# Patient Record
Sex: MALE | Race: WHITE | Employment: FULL TIME | URBAN - METROPOLITAN AREA
[De-identification: names, ages, dates, MRNs, and addresses within clinical notes are randomized per-mention and may not be internally consistent; named-entity substitution may affect disease eponyms.]

---

## 2017-10-18 ENCOUNTER — HOSPITAL ENCOUNTER (OUTPATIENT)
Dept: SURGERY | Age: 67
Discharge: HOME | End: 2017-10-18
Attending: SURGERY

## 2017-10-18 VITALS
OXYGEN SATURATION: 97 % | HEIGHT: 71 IN | DIASTOLIC BLOOD PRESSURE: 84 MMHG | HEART RATE: 63 BPM | WEIGHT: 181 LBS | RESPIRATION RATE: 16 BRPM | SYSTOLIC BLOOD PRESSURE: 150 MMHG | BODY MASS INDEX: 25.34 KG/M2

## 2017-10-24 ENCOUNTER — ANESTHESIA EVENT (OUTPATIENT)
Dept: SURGERY | Age: 67
End: 2017-10-24
Payer: MEDICARE

## 2017-10-25 ENCOUNTER — ANESTHESIA (OUTPATIENT)
Dept: SURGERY | Age: 67
End: 2017-10-25
Payer: MEDICARE

## 2017-10-25 ENCOUNTER — HOSPITAL ENCOUNTER (OUTPATIENT)
Age: 67
Setting detail: OUTPATIENT SURGERY
Discharge: HOME | End: 2017-10-25
Attending: SURGERY | Admitting: SURGERY
Payer: MEDICARE

## 2017-10-25 VITALS
WEIGHT: 181 LBS | DIASTOLIC BLOOD PRESSURE: 75 MMHG | HEART RATE: 62 BPM | TEMPERATURE: 97.8 F | BODY MASS INDEX: 25.34 KG/M2 | OXYGEN SATURATION: 96 % | HEIGHT: 71 IN | SYSTOLIC BLOOD PRESSURE: 134 MMHG | RESPIRATION RATE: 16 BRPM

## 2017-10-25 PROCEDURE — 88304 TISSUE EXAM BY PATHOLOGIST: CPT | Performed by: SURGERY

## 2017-10-25 PROCEDURE — 74011250636 HC RX REV CODE- 250/636

## 2017-10-25 PROCEDURE — 76210000006 HC OR PH I REC 0.5 TO 1 HR: Performed by: SURGERY

## 2017-10-25 PROCEDURE — 74011000250 HC RX REV CODE- 250: Performed by: SURGERY

## 2017-10-25 PROCEDURE — 77030002982 HC SUT POLYSRB J&J -A: Performed by: SURGERY

## 2017-10-25 PROCEDURE — 77030008467 HC STPLR SKN COVD -B: Performed by: SURGERY

## 2017-10-25 PROCEDURE — 77030018836 HC SOL IRR NACL ICUM -A: Performed by: SURGERY

## 2017-10-25 PROCEDURE — 74011250637 HC RX REV CODE- 250/637: Performed by: ANESTHESIOLOGY

## 2017-10-25 PROCEDURE — 76210000020 HC REC RM PH II FIRST 0.5 HR: Performed by: SURGERY

## 2017-10-25 PROCEDURE — 77030020782 HC GWN BAIR PAWS FLX 3M -B: Performed by: ANESTHESIOLOGY

## 2017-10-25 PROCEDURE — 76010000160 HC OR TIME 0.5 TO 1 HR INTENSV-TIER 1: Performed by: SURGERY

## 2017-10-25 PROCEDURE — 77030002916 HC SUT ETHLN J&J -A: Performed by: SURGERY

## 2017-10-25 PROCEDURE — 74011250636 HC RX REV CODE- 250/636: Performed by: SURGERY

## 2017-10-25 PROCEDURE — 74011250636 HC RX REV CODE- 250/636: Performed by: ANESTHESIOLOGY

## 2017-10-25 PROCEDURE — 74011000250 HC RX REV CODE- 250: Performed by: ANESTHESIOLOGY

## 2017-10-25 PROCEDURE — 76060000032 HC ANESTHESIA 0.5 TO 1 HR: Performed by: SURGERY

## 2017-10-25 PROCEDURE — 77030011640 HC PAD GRND REM COVD -A: Performed by: SURGERY

## 2017-10-25 RX ORDER — HYDROMORPHONE HYDROCHLORIDE 2 MG/ML
0.5 INJECTION, SOLUTION INTRAMUSCULAR; INTRAVENOUS; SUBCUTANEOUS
Status: DISCONTINUED | OUTPATIENT
Start: 2017-10-25 | End: 2017-10-25 | Stop reason: HOSPADM

## 2017-10-25 RX ORDER — FENTANYL CITRATE 50 UG/ML
100 INJECTION, SOLUTION INTRAMUSCULAR; INTRAVENOUS ONCE
Status: DISCONTINUED | OUTPATIENT
Start: 2017-10-25 | End: 2017-10-25 | Stop reason: HOSPADM

## 2017-10-25 RX ORDER — LIDOCAINE HYDROCHLORIDE AND EPINEPHRINE 10; 10 MG/ML; UG/ML
INJECTION, SOLUTION INFILTRATION; PERINEURAL AS NEEDED
Status: DISCONTINUED | OUTPATIENT
Start: 2017-10-25 | End: 2017-10-25 | Stop reason: HOSPADM

## 2017-10-25 RX ORDER — MIDAZOLAM HYDROCHLORIDE 1 MG/ML
5 INJECTION, SOLUTION INTRAMUSCULAR; INTRAVENOUS ONCE
Status: DISCONTINUED | OUTPATIENT
Start: 2017-10-25 | End: 2017-10-25 | Stop reason: HOSPADM

## 2017-10-25 RX ORDER — MIDAZOLAM HYDROCHLORIDE 1 MG/ML
2 INJECTION, SOLUTION INTRAMUSCULAR; INTRAVENOUS
Status: DISCONTINUED | OUTPATIENT
Start: 2017-10-25 | End: 2017-10-25 | Stop reason: HOSPADM

## 2017-10-25 RX ORDER — LIDOCAINE HYDROCHLORIDE 10 MG/ML
INJECTION INFILTRATION; PERINEURAL AS NEEDED
Status: DISCONTINUED | OUTPATIENT
Start: 2017-10-25 | End: 2017-10-25 | Stop reason: HOSPADM

## 2017-10-25 RX ORDER — OXYCODONE AND ACETAMINOPHEN 5; 325 MG/1; MG/1
1-2 TABLET ORAL
Qty: 30 TAB | Refills: 0 | Status: SHIPPED | OUTPATIENT
Start: 2017-10-25 | End: 2018-05-01

## 2017-10-25 RX ORDER — SODIUM CHLORIDE, SODIUM LACTATE, POTASSIUM CHLORIDE, CALCIUM CHLORIDE 600; 310; 30; 20 MG/100ML; MG/100ML; MG/100ML; MG/100ML
75 INJECTION, SOLUTION INTRAVENOUS CONTINUOUS
Status: DISCONTINUED | OUTPATIENT
Start: 2017-10-25 | End: 2017-10-25 | Stop reason: HOSPADM

## 2017-10-25 RX ORDER — FAMOTIDINE 20 MG/1
20 TABLET, FILM COATED ORAL ONCE
Status: COMPLETED | OUTPATIENT
Start: 2017-10-25 | End: 2017-10-25

## 2017-10-25 RX ORDER — CEFAZOLIN SODIUM IN 0.9 % NACL 2 G/50 ML
2 INTRAVENOUS SOLUTION, PIGGYBACK (ML) INTRAVENOUS ONCE
Status: COMPLETED | OUTPATIENT
Start: 2017-10-25 | End: 2017-10-25

## 2017-10-25 RX ORDER — LIDOCAINE HYDROCHLORIDE 10 MG/ML
0.1 INJECTION INFILTRATION; PERINEURAL AS NEEDED
Status: DISCONTINUED | OUTPATIENT
Start: 2017-10-25 | End: 2017-10-25 | Stop reason: HOSPADM

## 2017-10-25 RX ORDER — MIDAZOLAM HYDROCHLORIDE 1 MG/ML
INJECTION, SOLUTION INTRAMUSCULAR; INTRAVENOUS AS NEEDED
Status: DISCONTINUED | OUTPATIENT
Start: 2017-10-25 | End: 2017-10-25 | Stop reason: HOSPADM

## 2017-10-25 RX ORDER — EPINEPHRINE 1 MG/ML
INJECTION, SOLUTION, CONCENTRATE INTRAVENOUS AS NEEDED
Status: DISCONTINUED | OUTPATIENT
Start: 2017-10-25 | End: 2017-10-25 | Stop reason: HOSPADM

## 2017-10-25 RX ORDER — OXYCODONE HYDROCHLORIDE 5 MG/1
5 TABLET ORAL
Status: DISCONTINUED | OUTPATIENT
Start: 2017-10-25 | End: 2017-10-25 | Stop reason: HOSPADM

## 2017-10-25 RX ORDER — PROPOFOL 10 MG/ML
INJECTION, EMULSION INTRAVENOUS AS NEEDED
Status: DISCONTINUED | OUTPATIENT
Start: 2017-10-25 | End: 2017-10-25 | Stop reason: HOSPADM

## 2017-10-25 RX ORDER — BUPIVACAINE HYDROCHLORIDE 5 MG/ML
INJECTION, SOLUTION EPIDURAL; INTRACAUDAL AS NEEDED
Status: DISCONTINUED | OUTPATIENT
Start: 2017-10-25 | End: 2017-10-25 | Stop reason: HOSPADM

## 2017-10-25 RX ORDER — HYDROCODONE BITARTRATE AND ACETAMINOPHEN 5; 325 MG/1; MG/1
2 TABLET ORAL AS NEEDED
Status: DISCONTINUED | OUTPATIENT
Start: 2017-10-25 | End: 2017-10-25 | Stop reason: HOSPADM

## 2017-10-25 RX ADMIN — PROPOFOL 40 MG: 10 INJECTION, EMULSION INTRAVENOUS at 14:22

## 2017-10-25 RX ADMIN — PROPOFOL 30 MG: 10 INJECTION, EMULSION INTRAVENOUS at 14:34

## 2017-10-25 RX ADMIN — MIDAZOLAM HYDROCHLORIDE 2 MG: 1 INJECTION, SOLUTION INTRAMUSCULAR; INTRAVENOUS at 14:13

## 2017-10-25 RX ADMIN — LIDOCAINE HYDROCHLORIDE 0.1 ML: 10 INJECTION, SOLUTION INFILTRATION; PERINEURAL at 13:26

## 2017-10-25 RX ADMIN — PROPOFOL 30 MG: 10 INJECTION, EMULSION INTRAVENOUS at 14:28

## 2017-10-25 RX ADMIN — FAMOTIDINE 20 MG: 20 TABLET, FILM COATED ORAL at 13:14

## 2017-10-25 RX ADMIN — CEFAZOLIN 2 G: 1 INJECTION, POWDER, FOR SOLUTION INTRAMUSCULAR; INTRAVENOUS; PARENTERAL at 14:09

## 2017-10-25 RX ADMIN — PROPOFOL 30 MG: 10 INJECTION, EMULSION INTRAVENOUS at 14:31

## 2017-10-25 RX ADMIN — PROPOFOL 20 MG: 10 INJECTION, EMULSION INTRAVENOUS at 14:30

## 2017-10-25 RX ADMIN — PROPOFOL 20 MG: 10 INJECTION, EMULSION INTRAVENOUS at 14:23

## 2017-10-25 RX ADMIN — PROPOFOL 30 MG: 10 INJECTION, EMULSION INTRAVENOUS at 14:26

## 2017-10-25 RX ADMIN — SODIUM CHLORIDE, SODIUM LACTATE, POTASSIUM CHLORIDE, AND CALCIUM CHLORIDE 75 ML/HR: 600; 310; 30; 20 INJECTION, SOLUTION INTRAVENOUS at 13:26

## 2017-10-25 NOTE — IP AVS SNAPSHOT
[address] [address] 
[phone] Patient: Rito Whitaker MRN: [id number]:18/05/[id number] About your hospitalization You were admitted on:  October 25, 2017 You last received care in the:  [facility] PACU You were discharged on:  October 25, 2017 Why you were hospitalized Your primary diagnosis was:  Not on File Things You Need To Do (next 8 weeks) Follow up with Junior Davis MD  
  
  
 Follow up with Deirdre Henry MD  
  
Phone:  [phone] Where:  [address] [id number] Monday Nov 06, 2017 Global Post Op with Deirdre Henry MD at  1:30 PM  
Where:  [address] [facility] ([address] [facility]) Discharge Orders None A check silvia indicates which time of day the medication should be taken. My Medications TAKE these medications as instructed Instructions Each Dose to Equal  
 Morning Noon Evening Bedtime  
 aspirin delayed-release 81 mg tablet Your last dose was: Your next dose is: Take 81 mg by mouth every evening. 81 mg GARLIC PO Your last dose was: Your next dose is: Take  by mouth daily. multivitamin tablet Commonly known as:  ONE A DAY Your last dose was: Your next dose is: Take 1 Tab by mouth daily. 1 Tab  
    
   
   
   
  
 oxyCODONE-acetaminophen 5-325 mg per tablet Commonly known as:  PERCOCET Your last dose was: Your next dose is: Take 1-2 Tabs by mouth every four (4) hours as needed for Pain. Max Daily Amount: 12 Tabs. 1-2 Tab  
    
   
   
   
  
 TUMS PO Your last dose was: Your next dose is: Take  by mouth as needed. Where to Get Your Medications Information on where to get these meds will be given to you by the nurse or doctor. ! Ask your nurse or doctor about these medications  
  oxyCODONE-acetaminophen 5-325 mg per tablet Discharge Instructions 1. Diet as tolerated except for a  low fat diet after laparoscopic cholecystectomy. 2. Showering is allowed, but no tub baths, hot tubs or swimming. 3. Drainage is common from the wounds. Change the dressings as needed. Call our office if the wounds become reddened, tender, feel warm to the touch or pus starts to drain from the wound. 4. Take prescribed pain medication as directed, usually Percocet, Norco, Ultram or Dilaudid. Take over the counter medication for minor pain. 5. Ice may be applied intermittently to the surgical site or sites. 6. Call or office, [phone], if problems arise. 7. Follow up in the office at the assigned time. 8. Resume all medications as taken per surgery, unless specifically instructed not to take certain ones. 9. No lifting more than 25 pounds until told otherwise. 10. Driving is allowed 3 days after surgery as long as you feel comfortable enough to drive and have not taken any prescription pain medication prior to driving. Introducing hospitals & HEALTH SERVICES! [facility] introduces TVDeck patient portal. Now you can access parts of your medical record, email your doctor's office, and request medication refills online. 1. In your internet browser, go to [email or web address] 2. Click on the First Time User? Click Here link in the Sign In box. You will see the New Member Sign Up page. 3. Enter your TVDeck Access Code exactly as it appears below. You will not need to use this code after youve completed the sign-up process. If you do not sign up before the expiration date, you must request a new code. · TV[employer] Access Code: [email or web address] Expires: 1/15/2018 11:25 AM 
 
 4. Enter the last four digits of your Social Security Number (xxxx) and Date of Birth (mm/dd/yyyy) as indicated and click Submit. You will be taken to the next sign-up page. 5. Create a Cooleaf ID. This will be your Cooleaf login ID and cannot be changed, so think of one that is secure and easy to remember. 6. Create a Co[employer] password. You can change your password at any time. 7. Enter your Password Reset Question and Answer. This can be used at a later time if you forget your password. 8. Enter your e-mail address. You will receive e-mail notification when new information is available in [address] 9. Click Sign Up. You can now view and download portions of your medical record. 10. Click the Download Summary menu link to download a portable copy of your medical information. If you have questions, please visit the Frequently Asked Questions section of the [employer] website. Remember, Cooleaf is NOT to be used for urgent needs. For medical emergencies, dial 911. Now available from your iPhone and Android! Providers Seen During Your Hospitalization Provider Specialty Primary office phone Chris Andre, [address] [facility] [phone] Your Primary Care Physician (PCP) Primary Care Physician Office Phone Office Fax Alisson BERMUDEZ ** None ** ** None ** You are allergic to the following Allergen Reactions Prilosec (Omeprazole) Other (comments) Affected pt's kidney function Zithromax (Azithromycin) Diarrhea Recent Documentation Height Weight BMI Smoking Status 1.803 m 82.1 kg 25.24 kg/m2 Never Smoker Emergency Contacts Name Discharge Info Relation Home Work Mobile [address] CAREGIVER [3] Spouse [3] [phone] Patient Belongings The following personal items are in your possession at time of discharge: 
  Dental Appliances: None Please provide this summary of care documentation to your next provider. Signatures-by signing, you are acknowledging that this After Visit Summary has been reviewed with you and you have received a copy. Patient Signature:  ____________________________________________________________ Date:  ____________________________________________________________  
  
[facility] Provider Signature:  ____________________________________________________________ Date:  ____________________________________________________________

## 2017-10-25 NOTE — ANESTHESIA POSTPROCEDURE EVALUATION
Post-Anesthesia Evaluation and Assessment    Patient: Apollo Dill MRN: [id number]  SSN: [id number]    YOB: 1950  Age: 74 y.o. Sex: male       Cardiovascular Function/Vital Signs  Visit Vitals    /70 (BP 1 Location: Left arm, BP Patient Position: At rest)    Pulse 60    Temp 36.6 °C (97.8 °F)    Resp 16    Ht 5' 11\" (1.803 m)    Wt 82.1 kg (181 lb)    SpO2 95%    BMI 25.24 kg/m2       Patient is status post total IV anesthesia anesthesia for Procedure(s):  PILONIDAL CYSTECTOMY. Nausea/Vomiting: None    Postoperative hydration reviewed and adequate. Pain:  Pain Scale 1: Numeric (0 - 10) (10/25/17 1444)  Pain Intensity 1: 0 (10/25/17 1444)   Managed    Neurological Status:   Neuro (WDL): Within Defined Limits (10/25/17 1444)  Neuro  Neurologic State: Alert;Eyes open spontaneously (10/25/17 1444)  Cognition: Follows commands (10/25/17 1444)   At baseline    Mental Status and Level of Consciousness: Arousable    Pulmonary Status:   O2 Device: Room air (10/25/17 1459)   Adequate oxygenation and airway patent    Complications related to anesthesia: None    Post-anesthesia assessment completed.  No concerns    Signed By: Chloe Zepeda MD     October 25, 2017

## 2017-10-25 NOTE — OP NOTES
[address]   OPERATIVE REPORT       Name:  Kaylee Dee   MR#:  [id number]   :  1950   Account #:  [de-identified]   Date of Adm:  10/25/2017       PREPROCEDURE DIAGNOSIS: Pilonidal cyst.    POSTPROCEDURE DIAGNOSIS: Pilonidal cyst.    NAME OF PROCEDURE: Pilonidal cystectomy. SURGEON: Jenae Torres MD.    ANESTHESIA: Monitored anesthesia care +80 mL of local using 30   mL of 0.5% Marcaine with epinephrine, 20 mL 1% Xylocaine with   epinephrine, 20 mL of 1% Xylocaine without epinephrine. ESTIMATED BLOOD LOSS: 5 mL. SPECIMENS: Pilonidal cyst tissue. HISTORY: This is a 67-year-old male who was referred by [facility] for a pilonidal cyst. He was placed on   antibiotics. By the time he got to me, it had already ceased   being infected. It had already gotten infected twice. He asked   what surgically could be done to prevent this from returning. I   said we could excise the areas of pilonidal cystectomy. He was   agreeable. I went through the procedure, risks of bleeding,   infection, anesthesia, injury to the surrounding tissue,   numbness, potential for recurrence in the lesion or the problem. The patient was agreeable, signed a consent form. DESCRIPTION OF PROCEDURE: The patient was seen in the   preoperative area. He was then transported to room #1 at [address] He was placed on the operating table   in the prone position. He did not want to have general   anesthesia, he was given some sedation. The area was prepped and   draped in the usual sterile manner. I did a timeout identifying   the patient, surgeon, procedure and his birth date. Once   everyone in the room agreed, we begin the procedure.  I injected   30 mL of 0.5% Marcaine with epinephrine, 20 mL of 1% Xylocaine   with epinephrine, and then finally 20 mL of 1% Xylocaine without   epinephrine, as he was still moving after 50 mL of local. I then   made an elliptical type pattern with a marking pen around some   crypts consistent with pilonidal cyst. We followed the   elliptical pattern with a 15 scalpel blade, excised skin and   soft tissue. The pilonidal cyst tissue was removed and sent to   Pathology for evaluation. Flaps were raised circumferentially. The wound was irrigated and hemostasis achieved with   electrocautery. I closed the deep tissue with interrupted   sutures of 2-0 Vicryl. The skin was closed with vertical   mattress sutures of 2-0 nylon and surgical staples. The patient   had dry sterile dressing applied. Tolerated the procedure   well, was brought to recovery room in stable condition.         Olive Mobley MD      [address] [facility] / [facility]   D:  10/25/2017   14:47   T:  10/25/2017   15:10   Job #:  [id number]

## 2017-10-25 NOTE — ANESTHESIA PREPROCEDURE EVALUATION
Anesthetic History     PONV          Review of Systems / Medical History  Patient summary reviewed and pertinent labs reviewed    Pulmonary  Within defined limits                 Neuro/Psych   Within defined limits           Cardiovascular                  Exercise tolerance: >4 METS     GI/Hepatic/Renal     GERD: well controlled           Endo/Other  Within defined limits           Other Findings            Physical Exam    Airway  Mallampati: III  TM Distance: < 4 cm  Neck ROM: normal range of motion   Mouth opening: Normal     Cardiovascular  Regular rate and rhythm,  S1 and S2 normal,  no murmur, click, rub, or gallop             Dental         Pulmonary  Breath sounds clear to auscultation               Abdominal  GI exam deferred       Other Findings            Anesthetic Plan    ASA: 2  Anesthesia type: total IV anesthesia          Induction: Intravenous  Anesthetic plan and risks discussed with: Patient and Spouse

## 2017-10-25 NOTE — IP AVS SNAPSHOT
[address] [place] [id number] 
[phone] Patient: Gloria Severance MRN: [id number] ZYR:70/22/3352 My Medications TAKE these medications as instructed Instructions Each Dose to Equal  
 Morning Noon Evening Bedtime  
 aspirin delayed-release 81 mg tablet Your last dose was: Your next dose is: Take 81 mg by mouth every evening. 81 mg GARLIC PO Your last dose was: Your next dose is: Take  by mouth daily. multivitamin tablet Commonly known as:  ONE A DAY Your last dose was: Your next dose is: Take 1 Tab by mouth daily. 1 Tab  
    
   
   
   
  
 oxyCODONE-acetaminophen 5-325 mg per tablet Commonly known as:  PERCOCET Your last dose was: Your next dose is: Take 1-2 Tabs by mouth every four (4) hours as needed for Pain. Max Daily Amount: 12 Tabs. 1-2 Tab  
    
   
   
   
  
 TUMS PO Your last dose was: Your next dose is: Take  by mouth as needed. Where to Get Your Medications Information on where to get these meds will be given to you by the nurse or doctor. ! Ask your nurse or doctor about these medications  
  oxyCODONE-acetaminophen 5-325 mg per tablet

## 2017-10-25 NOTE — H&P
Progress Notes  Encounter Date: 10/25/17  Ashish Guevara MD   General Surgery      []Hide copied text  []Saida for attribution information  aDate: 10/25/17        Name: Bonifacio Chris      MRN: [id number]       : 1950       Age: 74 y.o. Sex: male  Samuel Valdivia MD         CC:         Chief Complaint   Patient presents with    New Patient       pilonidal         HPI:      Bonifacio Chris is a 74 y.o. male who presents for evaluation of a pilonidal cyst as a referral from [address]. The patient had the second episode of swelling, pain and drainage over the past nine months last week. He was told to see a surgeon to have the pilonidal cyst evaluated. No fever or chills. Some pain at present, but better since it has \"drained out. \"     PMH:          Past Medical History:   Diagnosis Date    CKD (chronic kidney disease) 2013    Dental disorder      Esophageal reflux 2013    GERD (gastroesophageal reflux disease)      Hyperlipidemia 2013    IFG (impaired fasting glucose) 2013    Prostatitis, chronic 2013         PSH:           Past Surgical History:   Procedure Laterality Date    HX HERNIA REPAIR    &     HX TONSILLECTOMY                  MEDS:          Current Outpatient Prescriptions   Medication Sig    multivitamin (ONE A DAY) tablet Take 1 Tab by mouth daily.     aspirin delayed-release 81 mg tablet Take 81 mg by mouth daily.      No current facility-administered medications for this visit.          ALLERGIES:             Allergies   Allergen Reactions    Prilosec [Omeprazole] Other (comments)       Affected pt's kidney function    Zithromax [Azithromycin] Diarrhea         SH:          Social History   Substance Use Topics    Smoking status: Never Smoker    Smokeless tobacco: Never Used    Alcohol use No         FH:           Family History   Problem Relation Age of Onset    Stroke Mother      Diabetes Sister           ROS: The patient has no difficulty with chest pain or shortness of breath. No fever or chills. Comprehensive 13 point review of systems was otherwise unremarkable except as noted above.     Physical Exam:           Visit Vitals    /89    Pulse 69    Ht 5' 11\" (1.803 m)    Wt 181 lb (82.1 kg)    BMI 25.24 kg/m2         General: Alert, oriented, cooperative white male in no acute distress. Eyes: Sclera are clear. Conjunctiva and lids within normal limits. No icterus. Ears and Nose: no gross deformities to visual inspection, gross hearing intact  Neck: Supple, trachea midline. No lymphadenopathy. Back: Crypts c/w pilonidal cyst with serous drainage in the upper jessica cleft. No cellulitis. Resp: Breathing is  non-labored. Lungs clear to auscultation without wheezing or rhonchi   CV: RRR. No murmurs, rubs or gallops appreciated. Abd: soft non-tender and non-distended without peritoneal signs. +bs    Psych:  Mood and affect appropriate. Short-term memory and understanding intact        Assessment/Plan:  Surya Tuttle is a 74 y.o. male who has signs and symptoms consistent with a pilondal cyst.     1. Pilonidal cystectomy in the operating room.     2.  I went through the risks of bleeding, infection, anesthesia, hematoma/seroma formation and possible recurrence of the lesion. I said it may be necessary to place a drain.  It may be necessary to leave the wound open, if badly infected.     Tao Escobar MD                           [facility]   10/25/17                         Electronically signed by Tao Escobar MD at 10/25/17        10/25/17             Revision History              Detailed Report             Note shared with patient   Note Details   Author Tao Escobar MD File Time 10/25/17   Author Type Physician Status Signed   Last  Tao Escobar MD Specialty General Surgery

## 2017-10-25 NOTE — DISCHARGE INSTRUCTIONS
1. Diet as tolerated except for a  low fat diet after laparoscopic cholecystectomy. 2. Showering is allowed, but no tub baths, hot tubs or swimming. 3. Drainage is common from the wounds. Change the dressings as needed. Call our office if the wounds become reddened, tender, feel warm to the touch or pus starts to drain from the wound. 4. Take prescribed pain medication as directed, usually Percocet, Norco, Ultram or Dilaudid. Take over the counter medication for minor pain. 5. Ice may be applied intermittently to the surgical site or sites. 6. Call or office, [phone], if problems arise. 7. Follow up in the office at the assigned time. 8. Resume all medications as taken per surgery, unless specifically instructed not to take certain ones. 9. No lifting more than 25 pounds until told otherwise. 10. Driving is allowed 3 days after surgery as long as you feel comfortable enough to drive and have not taken any prescription pain medication prior to driving.

## 2017-10-25 NOTE — INTERVAL H&P NOTE
H&P Update:  Rito Whitaker was seen and examined. History and physical has been reviewed. The patient has been examined.  There have been no significant clinical changes since the completion of the originally dated History and Physical.    Signed By: Deirdre Henry MD     October 25, 2017 9:56 AM

## 2018-05-01 PROBLEM — R35.1 BENIGN PROSTATIC HYPERPLASIA WITH NOCTURIA: Status: ACTIVE | Noted: 2018-05-01

## 2018-05-01 PROBLEM — R35.1 BENIGN PROSTATIC HYPERPLASIA WITH NOCTURIA: Chronic | Status: ACTIVE | Noted: 2018-05-01

## 2018-05-01 PROBLEM — N40.1 BENIGN PROSTATIC HYPERPLASIA WITH NOCTURIA: Chronic | Status: ACTIVE | Noted: 2018-05-01

## 2018-05-01 PROBLEM — N40.1 BENIGN PROSTATIC HYPERPLASIA WITH NOCTURIA: Status: ACTIVE | Noted: 2018-05-01

## 2019-05-13 NOTE — BRIEF OP NOTE
BRIEF OPERATIVE NOTE    Date of Procedure: 10/25/2017   Preoperative Diagnosis: PILONIDAL CYST  Postoperative Diagnosis: SAME    Procedure(s):  PILONIDAL CYSTECTOMY  Surgeon(s) and Role:     * Tera Doyle MD - Primary         Assistant Staff:       Surgical Staff:  Circ-1: Niraj De La Cruz RN  Scrub Tech-1: Pj Felix  Scrub Tech-2: Satish Mcclain  Event Time In   Incision Start 1422   Incision Close 1438     Anesthesia: MAC with local  Estimated Blood Loss: 5 cc's  Specimens:   ID Type Source Tests Collected by Time Destination   1 : pilonidal cyst Preservative   Tera Doyle MD 10/25/2017 1431 Pathology      Findings: See dictated note  Complications: None  Implants: * No implants in log *
What Type Of Note Output Would You Prefer (Optional)?: Standard Output
How Severe Is Your Skin Lesion?: mild
Has Your Skin Lesion Been Treated?: not been treated
Is This A New Presentation, Or A Follow-Up?: Mole

## 2021-08-27 ENCOUNTER — TRANSCRIBE ORDER (OUTPATIENT)
Dept: SCHEDULING | Age: 71
End: 2021-08-27

## 2021-08-27 DIAGNOSIS — K80.20 CHOLECYSTOLITHIASIS: Primary | ICD-10-CM

## 2021-08-27 DIAGNOSIS — D72.829 LEUCOCYTOSIS: ICD-10-CM

## 2021-09-17 PROBLEM — K44.9 HIATAL HERNIA: Status: ACTIVE | Noted: 2021-09-01

## 2021-09-17 PROBLEM — K80.20 CALCULUS OF GALLBLADDER WITHOUT CHOLECYSTITIS WITHOUT OBSTRUCTION: Status: ACTIVE | Noted: 2021-09-01

## 2021-09-17 PROBLEM — R10.9 RIGHT SIDED ABDOMINAL PAIN: Status: ACTIVE | Noted: 2021-09-01

## 2021-09-27 ENCOUNTER — HOSPITAL ENCOUNTER (OUTPATIENT)
Dept: GENERAL RADIOLOGY | Age: 71
Discharge: HOME | End: 2021-09-27
Attending: INTERNAL MEDICINE
Payer: MEDICARE

## 2021-09-27 DIAGNOSIS — R19.7 DIARRHEA, UNSPECIFIED TYPE: ICD-10-CM

## 2021-09-27 DIAGNOSIS — R91.8 X-RAY OF LUNG, ABNORMAL: ICD-10-CM

## 2021-09-27 PROCEDURE — 71046 X-RAY EXAM CHEST 2 VIEWS: CPT

## 2021-10-19 PROBLEM — E78.00 ELEVATED LDL CHOLESTEROL LEVEL: Status: ACTIVE | Noted: 2021-10-19

## 2022-03-19 PROBLEM — K80.20 CALCULUS OF GALLBLADDER WITHOUT CHOLECYSTITIS WITHOUT OBSTRUCTION: Status: ACTIVE | Noted: 2021-09-01

## 2022-03-19 PROBLEM — R10.9 RIGHT SIDED ABDOMINAL PAIN: Status: ACTIVE | Noted: 2021-09-01

## 2022-03-19 PROBLEM — K44.9 HIATAL HERNIA: Status: ACTIVE | Noted: 2021-09-01

## 2022-03-19 PROBLEM — R35.1 BENIGN PROSTATIC HYPERPLASIA WITH NOCTURIA: Status: ACTIVE | Noted: 2018-05-01

## 2022-03-19 PROBLEM — E78.00 ELEVATED LDL CHOLESTEROL LEVEL: Status: ACTIVE | Noted: 2021-10-19

## 2022-03-19 PROBLEM — N40.1 BENIGN PROSTATIC HYPERPLASIA WITH NOCTURIA: Status: ACTIVE | Noted: 2018-05-01

## 2022-10-25 ENCOUNTER — NURSE ONLY (OUTPATIENT)
Dept: INTERNAL MEDICINE CLINIC | Facility: CLINIC | Age: 72
End: 2022-10-25

## 2022-10-25 DIAGNOSIS — Z00.00 MEDICARE ANNUAL WELLNESS VISIT, SUBSEQUENT: ICD-10-CM

## 2022-10-25 DIAGNOSIS — E78.2 MIXED HYPERLIPIDEMIA: ICD-10-CM

## 2022-10-25 DIAGNOSIS — E78.00 ELEVATED LDL CHOLESTEROL LEVEL: ICD-10-CM

## 2022-10-25 DIAGNOSIS — Z00.00 MEDICARE ANNUAL WELLNESS VISIT, SUBSEQUENT: Primary | ICD-10-CM

## 2022-10-25 DIAGNOSIS — N40.1 BENIGN PROSTATIC HYPERPLASIA WITH NOCTURIA: ICD-10-CM

## 2022-10-25 DIAGNOSIS — R35.1 BENIGN PROSTATIC HYPERPLASIA WITH NOCTURIA: ICD-10-CM

## 2022-10-25 LAB
BASOPHILS # BLD: 0.1 K/UL (ref 0–0.2)
BASOPHILS NFR BLD: 1 % (ref 0–2)
DIFFERENTIAL METHOD BLD: ABNORMAL
EOSINOPHIL # BLD: 0.3 K/UL (ref 0–0.8)
EOSINOPHIL NFR BLD: 3 % (ref 0.5–7.8)
ERYTHROCYTE [DISTWIDTH] IN BLOOD BY AUTOMATED COUNT: 14.5 % (ref 11.9–14.6)
HCT VFR BLD AUTO: 50.5 % (ref 41.1–50.3)
HGB BLD-MCNC: 16.2 G/DL (ref 13.6–17.2)
IMM GRANULOCYTES # BLD AUTO: 0 K/UL (ref 0–0.5)
IMM GRANULOCYTES NFR BLD AUTO: 0 % (ref 0–5)
LYMPHOCYTES # BLD: 2.4 K/UL (ref 0.5–4.6)
LYMPHOCYTES NFR BLD: 28 % (ref 13–44)
MCH RBC QN AUTO: 31.6 PG (ref 26.1–32.9)
MCHC RBC AUTO-ENTMCNC: 32.1 G/DL (ref 31.4–35)
MCV RBC AUTO: 98.6 FL (ref 82–102)
MONOCYTES # BLD: 1 K/UL (ref 0.1–1.3)
MONOCYTES NFR BLD: 12 % (ref 4–12)
NEUTS SEG # BLD: 4.9 K/UL (ref 1.7–8.2)
NEUTS SEG NFR BLD: 56 % (ref 43–78)
NRBC # BLD: 0 K/UL (ref 0–0.2)
PLATELET # BLD AUTO: 284 K/UL (ref 150–450)
PMV BLD AUTO: 10.6 FL (ref 9.4–12.3)
RBC # BLD AUTO: 5.12 M/UL (ref 4.23–5.6)
WBC # BLD AUTO: 8.7 K/UL (ref 4.3–11.1)

## 2022-10-26 LAB
ALBUMIN SERPL-MCNC: 3.9 G/DL (ref 3.2–4.6)
ALBUMIN/GLOB SERPL: 1.2 {RATIO} (ref 0.4–1.6)
ALP SERPL-CCNC: 56 U/L (ref 50–136)
ALT SERPL-CCNC: 31 U/L (ref 12–65)
ANION GAP SERPL CALC-SCNC: 5 MMOL/L (ref 2–11)
AST SERPL-CCNC: 23 U/L (ref 15–37)
BILIRUB SERPL-MCNC: 0.8 MG/DL (ref 0.2–1.1)
BUN SERPL-MCNC: 16 MG/DL (ref 8–23)
CALCIUM SERPL-MCNC: 9.4 MG/DL (ref 8.3–10.4)
CHLORIDE SERPL-SCNC: 106 MMOL/L (ref 101–110)
CHOLEST SERPL-MCNC: 191 MG/DL
CO2 SERPL-SCNC: 29 MMOL/L (ref 21–32)
CREAT SERPL-MCNC: 1.2 MG/DL (ref 0.8–1.5)
GLOBULIN SER CALC-MCNC: 3.2 G/DL (ref 2.8–4.5)
GLUCOSE SERPL-MCNC: 108 MG/DL (ref 65–100)
HDLC SERPL-MCNC: 52 MG/DL (ref 40–60)
HDLC SERPL: 3.7 {RATIO}
LDLC SERPL CALC-MCNC: 120.8 MG/DL
POTASSIUM SERPL-SCNC: 4.8 MMOL/L (ref 3.5–5.1)
PROT SERPL-MCNC: 7.1 G/DL (ref 6.3–8.2)
PSA SERPL-MCNC: 2.5 NG/ML
SODIUM SERPL-SCNC: 140 MMOL/L (ref 133–143)
TRIGL SERPL-MCNC: 91 MG/DL (ref 35–150)
TSH, 3RD GENERATION: 2.21 UIU/ML (ref 0.36–3.74)
VLDLC SERPL CALC-MCNC: 18.2 MG/DL (ref 6–23)

## 2022-11-01 ENCOUNTER — OFFICE VISIT (OUTPATIENT)
Dept: INTERNAL MEDICINE CLINIC | Facility: CLINIC | Age: 72
End: 2022-11-01
Payer: MEDICARE

## 2022-11-01 VITALS
OXYGEN SATURATION: 98 % | DIASTOLIC BLOOD PRESSURE: 62 MMHG | SYSTOLIC BLOOD PRESSURE: 112 MMHG | WEIGHT: 185 LBS | TEMPERATURE: 98.2 F | HEART RATE: 63 BPM | HEIGHT: 71 IN | BODY MASS INDEX: 25.9 KG/M2

## 2022-11-01 DIAGNOSIS — Z00.00 MEDICARE ANNUAL WELLNESS VISIT, SUBSEQUENT: Primary | ICD-10-CM

## 2022-11-01 PROCEDURE — 3017F COLORECTAL CA SCREEN DOC REV: CPT | Performed by: INTERNAL MEDICINE

## 2022-11-01 PROCEDURE — G8484 FLU IMMUNIZE NO ADMIN: HCPCS | Performed by: INTERNAL MEDICINE

## 2022-11-01 PROCEDURE — 1123F ACP DISCUSS/DSCN MKR DOCD: CPT | Performed by: INTERNAL MEDICINE

## 2022-11-01 PROCEDURE — G0439 PPPS, SUBSEQ VISIT: HCPCS | Performed by: INTERNAL MEDICINE

## 2022-11-01 ASSESSMENT — PATIENT HEALTH QUESTIONNAIRE - PHQ9
SUM OF ALL RESPONSES TO PHQ9 QUESTIONS 1 & 2: 0
2. FEELING DOWN, DEPRESSED OR HOPELESS: 0
SUM OF ALL RESPONSES TO PHQ QUESTIONS 1-9: 0
1. LITTLE INTEREST OR PLEASURE IN DOING THINGS: 0

## 2022-11-01 ASSESSMENT — LIFESTYLE VARIABLES
HOW MANY STANDARD DRINKS CONTAINING ALCOHOL DO YOU HAVE ON A TYPICAL DAY: PATIENT DOES NOT DRINK
HOW OFTEN DO YOU HAVE A DRINK CONTAINING ALCOHOL: NEVER

## 2022-11-01 NOTE — PROGRESS NOTES
Medicare Annual Wellness Visit    Fab Hahn is here for Medicare AWV and Discuss Labs    Assessment & Plan   Medicare annual wellness visit, subsequent      Recommendations for Preventive Services Due: see orders and patient instructions/AVS.  Recommended screening schedule for the next 5-10 years is provided to the patient in written form: see Patient Instructions/AVS.     Return in 1 year (on 11/1/2023) for Medicare Annual Wellness Visit in 1 year. Subjective     Recently hiked up and down [facility] in one day. Felt good. No abd pain or bleeding. Still working part time as a . Declines vaccines. Labs reviewed and discussed.       Lab Results   Component Value Date/Time    WBC 8.7 10/25/2022 11:30 AM    HGB 16.2 10/25/2022 11:30 AM    HCT 50.5 10/25/2022 11:30 AM    MCV 98.6 10/25/2022 11:30 AM    RDW 14.5 10/25/2022 11:30 AM     10/25/2022 11:30 AM    NEUTOPHILPCT 47 10/12/2021 09:37 AM    LYMPHOPCT 28 10/25/2022 11:30 AM    LYMPHOPCT 33 10/12/2021 09:37 AM    MONOPCT 12 10/25/2022 11:30 AM    MONOPCT 14 10/12/2021 09:37 AM    EOSRELPCT 3 10/25/2022 11:30 AM    BASOPCT 1 10/25/2022 11:30 AM    BASOPCT 1 10/12/2021 09:37 AM    LYMPHSABS 2.4 10/25/2022 11:30 AM    LYMPHSABS 2.7 10/12/2021 09:37 AM    MONOSABS 1.0 10/25/2022 11:30 AM    MONOSABS 1.1 10/12/2021 09:37 AM    EOSABS 0.3 10/25/2022 11:30 AM    EOSABS 0.4 10/12/2021 09:37 AM    BASOSABS 0.1 10/25/2022 11:30 AM    IMMGRAN 0 10/25/2022 11:30 AM    GRANULOCYTEABSOLUTECOUNT 0.0 10/12/2021 09:37 AM       Lab Results   Component Value Date/Time     10/25/2022 11:30 AM    K 4.8 10/25/2022 11:30 AM     10/25/2022 11:30 AM    CO2 29 10/25/2022 11:30 AM    ANIONGAP 5 10/25/2022 11:30 AM    GLUCOSE 108 10/25/2022 11:30 AM    BUN 16 10/25/2022 11:30 AM    CREATININE 1.20 10/25/2022 11:30 AM    GFRAA 81 10/12/2021 09:37 AM    LABGLOM >60 10/25/2022 11:30 AM    CALCIUM 9.4 10/25/2022 11:30 AM BILITOT 0.8 10/25/2022 11:30 AM    ALT 31 10/25/2022 11:30 AM    AST 23 10/25/2022 11:30 AM    ALKPHOS 56 10/25/2022 11:30 AM    ALKPHOS 53 10/12/2021 09:37 AM    PROT 7.1 10/25/2022 11:30 AM    LABALBU 3.9 10/25/2022 11:30 AM    GLOB 3.2 10/25/2022 11:30 AM    ALBUMIN 1.2 10/25/2022 11:30 AM       Lab Results   Component Value Date/Time    CHOL 191 10/25/2022 11:30 AM    HDL 52 10/25/2022 11:30 AM    TRIG 91 10/25/2022 11:30 AM    LDLCALC 120.8 10/25/2022 11:30 AM    VLDL 14 10/12/2021 09:37 AM       No results found for: LABA1C    Lab Results   Component Value Date/Time    TSH 1.980 10/12/2021 09:37 AM    TSH 2.730 10/06/2020 09:42 AM    TSH 2.530 07/02/2019 09:14 AM       Lab Results   Component Value Date/Time    PSA 2.5 10/25/2022 11:30 AM    PSA 1.6 10/12/2021 09:37 AM    PSA 1.3 10/06/2020 09:42 AM    PSA 1.8 10/02/2019 09:29 AM    PSA 2.1 07/02/2019 09:14 AM       Lab Results   Component Value Date/Time    SPECGRAV 1.017 10/12/2021 09:37 AM    PHUR 7.5 10/12/2021 09:37 AM    COLORU Yellow 10/12/2021 09:37 AM    CLARITYU Cloudy 10/12/2021 09:37 AM    LEUKOCYTESUR Negative 10/12/2021 09:37 AM    PROTEINU Negative 10/12/2021 09:37 AM    GLUCOSEU Negative 10/12/2021 09:37 AM    KETUA Negative 10/12/2021 09:37 AM    BLOODU Negative 10/12/2021 09:37 AM    BILIRUBINUR Negative 10/12/2021 09:37 AM    UROBILINOGEN 0.2 10/12/2021 09:37 AM    NITRU Negative 10/12/2021 09:37 AM    LABMICR Comment 10/12/2021 09:37 AM          The patient's available records and electronic chart records are reviewed. The PMH, PSH, medications, allergies, medications, FH, health maintenance and vaccination status are all reviewed and updated as appropriate. Patient's complete Health Risk Assessment and screening values have been reviewed and are found in Flowsheets. The following problems were reviewed today and where indicated follow up appointments were made and/or referrals ordered.     Positive Risk Factor Screenings with Interventions:             General Health and ACP:  General  In general, how would you say your health is?: Excellent  In the past 7 days, have you experienced any of the following: New or Increased Pain, New or Increased Fatigue, Loneliness, Social Isolation, Stress or Anger?: No  Do you get the social and emotional support that you need?: Yes  Do you have a Living Will?: Yes    Advance Directives       Power of  Living Will ACP-Advance Directive ACP-Power of     Not on File Not on File Not on File Not on File          General Health Risk Interventions:  No Living Will: Advance Care Planning addressed with patient today  This patient presents for routine wellness examination and state of health at this time. Healthy lifestyle, diet, and exercise routines are discussed. Routine health maintenance issues are reviewed and discussed and ordered and/or completed as appropriate. Routine labs are reviewed and/or ordered as appropriate. The patient will follow-up in 1 year or sooner if needed. The natural history of prostate cancer and ongoing controversy regarding screening and potential treatment outcomes of prostate cancer has been discussed with the patient. The meaning of a false positive PSA and a false negative PSA has been discussed. Hearing/Vision:  Do you or your family notice any trouble with your hearing that hasn't been managed with hearing aids?: No  Do you have difficulty driving, watching TV, or doing any of your daily activities because of your eyesight?: (!) Yes  Have you had an eye exam within the past year?: Yes  No results found.   Hearing/Vision Interventions:  Hearing concerns:  hearing eval if wishes to purse or has any concerns  Vision concerns:  patient encouraged to make appointment with his/her eye specialist    Safety:  Do you have working smoke detectors?: (!) No  Do you have any tripping hazards - loose or unsecured carpets or rugs?: No  Do you have any tripping hazards - clutter in doorways, halls, or stairs?: No  Do you have either shower bars, grab bars, non-slip mats or non-slip surfaces in your shower or bathtub?: (!) No  Do all of your stairways have a railing or banister?: Yes  Do you always fasten your seatbelt when you are in a car?: Yes  Safety Interventions:  Home safety tips provided           Objective   Vitals:    11/01/22 1127   BP: 112/62   Site: Left Upper Arm   Position: Sitting   Cuff Size: Small Adult   Pulse: 63   Temp: 98.2 °F (36.8 °C)   TempSrc: Skin   SpO2: 98%   Weight: 185 lb (83.9 kg)   Height: 5' 11\" (1.803 m)      Body mass index is 25.8 kg/m².       General Appearance: alert and oriented to person, place and time, well developed and well- nourished, in no acute distress  Skin: warm and dry, no rash or erythema  Head: normocephalic and atraumatic  Eyes: pupils equal, round, and reactive to light, extraocular eye movements intact, conjunctivae normal  ENT: tympanic membrane, external ear and ear canal normal bilaterally, nose without deformity, nasal mucosa and turbinates normal without polyps  Neck: supple and non-tender without mass, no thyromegaly or thyroid nodules, no cervical lymphadenopathy  Pulmonary/Chest: clear to auscultation bilaterally- no wheezes, rales or rhonchi, normal air movement, no respiratory distress  Cardiovascular: normal rate, regular rhythm, normal S1 and S2, no murmurs, rubs, clicks, or gallops, distal pulses intact, no carotid bruits  Abdomen: soft, non-tender, non-distended, normal bowel sounds, no masses or organomegaly  Extremities: no cyanosis, clubbing or edema  Musculoskeletal: normal range of motion, no joint swelling, deformity or tenderness  Neurologic: reflexes normal and symmetric, no cranial nerve deficit, gait, coordination and speech normal       Allergies   Allergen Reactions    Azithromycin Diarrhea    Levofloxacin Other (See Comments)     Confusion    Omeprazole Other (See Comments)     Affected pt's kidney function  Other reaction(s): Other (see comments)  Affected pt's kidney function     Prior to Visit Medications    Medication Sig Taking?  Authorizing Provider   Multiple Vitamins-Minerals (MULTI COMPLETE PO) Take by mouth Yes Historical Provider, MD   GARLIC PO Take by mouth daily Yes Ar Automatic Reconciliation       CareTeam (Including outside providers/suppliers regularly involved in providing care):   Patient Care Team:  Eliot Ballesteros MD as PCP - Migdalia Banda MD as PCP - Javier Granados Dr Empdanishled Provider     Reviewed and updated this visit:  Tobacco  Allergies  Meds  Problems  Med Hx  Surg Hx  Soc Hx  Fam Hx

## 2022-11-01 NOTE — PATIENT INSTRUCTIONS
Personalized Preventive Plan for Nancy Wilcox - 11/1/2022  Medicare offers a range of preventive health benefits. Some of the tests and screenings are paid in full while other may be subject to a deductible, co-insurance, and/or copay. Some of these benefits include a comprehensive review of your medical history including lifestyle, illnesses that may run in your family, and various assessments and screenings as appropriate. After reviewing your medical record and screening and assessments performed today your provider may have ordered immunizations, labs, imaging, and/or referrals for you. A list of these orders (if applicable) as well as your Preventive Care list are included within your After Visit Summary for your review. Other Preventive Recommendations:    A preventive eye exam performed by an eye specialist is recommended every 1-2 years to screen for glaucoma; cataracts, macular degeneration, and other eye disorders. A preventive dental visit is recommended every 6 months. Try to get at least 150 minutes of exercise per week or 10,000 steps per day on a pedometer . Order or download the FREE \"Exercise & Physical Activity: Your Everyday Guide\" from [employer]. Call 9-904.559.4130 or search [employer] online. You need 9737-2631 mg of calcium and 9615-3475 IU of vitamin D per day. It is possible to meet your calcium requirement with diet alone, but a vitamin D supplement is usually necessary to meet this goal.  When exposed to the sun, use a sunscreen that protects against both UVA and UVB radiation with an SPF of 30 or greater. Reapply every 2 to 3 hours or after sweating, drying off with a towel, or swimming. Always wear a seat belt when traveling in a car. Always wear a helmet when riding a bicycle or motorcycle.

## 2023-05-17 ENCOUNTER — OFFICE VISIT (OUTPATIENT)
Dept: ORTHOPEDIC SURGERY | Age: 73
End: 2023-05-17

## 2023-05-17 DIAGNOSIS — M25.562 ACUTE PAIN OF LEFT KNEE: Primary | ICD-10-CM

## 2023-05-17 DIAGNOSIS — M17.12 PRIMARY OSTEOARTHRITIS OF LEFT KNEE: ICD-10-CM

## 2023-05-17 DIAGNOSIS — S83.242A TEAR OF MEDIAL MENISCUS OF LEFT KNEE, UNSPECIFIED TEAR TYPE, UNSPECIFIED WHETHER OLD OR CURRENT TEAR, INITIAL ENCOUNTER: ICD-10-CM

## 2023-05-17 RX ORDER — METHYLPREDNISOLONE ACETATE 40 MG/ML
40 INJECTION, SUSPENSION INTRA-ARTICULAR; INTRALESIONAL; INTRAMUSCULAR; SOFT TISSUE ONCE
Status: COMPLETED | OUTPATIENT
Start: 2023-05-17 | End: 2023-05-17

## 2023-05-17 RX ADMIN — METHYLPREDNISOLONE ACETATE 40 MG: 40 INJECTION, SUSPENSION INTRA-ARTICULAR; INTRALESIONAL; INTRAMUSCULAR; SOFT TISSUE at 11:49

## 2023-05-17 NOTE — PROGRESS NOTES
(MULTI COMPLETE PO), Take by mouth, Disp: , Rfl:     GARLIC PO, Take by mouth daily, Disp: , Rfl:   Allergies   Allergen Reactions    Azithromycin Diarrhea    Levofloxacin Other (See Comments)     Confusion    Omeprazole Other (See Comments)     Affected pt's kidney function  Other reaction(s): Other (see comments)  Affected pt's kidney function       CC:left knee pain    Impression: Osteoarthritis of the left knee    Procedure: Depomedrol injection     Procedure Note: The left knee was prepped with alcohol. Then the left knee was injected with 1 mL of 0.5% Marcaine and 40mg of depomedrol The patient tolerated the procedure without difficulty.       Marium Abreu MD

## 2023-05-31 ENCOUNTER — OFFICE VISIT (OUTPATIENT)
Dept: ORTHOPEDIC SURGERY | Age: 73
End: 2023-05-31

## 2023-05-31 DIAGNOSIS — M94.262 CHONDROMALACIA OF LEFT KNEE: ICD-10-CM

## 2023-05-31 DIAGNOSIS — M17.12 PRIMARY OSTEOARTHRITIS OF LEFT KNEE: Primary | ICD-10-CM

## 2023-05-31 NOTE — PROGRESS NOTES
Name: Dimitri Salvador  YOB: 1950  Gender: male  MRN: [id number]      Mr. Rosemary Malave comes in in follow-up for his left knee status post injection and MRI. He states that the knee was already improving considerably prior to his initial visit with me. He states that the injection did help him some. He has many questions about the MRI findings and questions regarding returning to activity. Interval medical history is updated. On physical exam, he is ambulatory with no significant limp. His left knee is without effusion. His MRI report is reviewed with him today. He does have evidence of patellofemoral chondromalacia that was evident on plain films. He does have some tendinopathy of the popliteus tendon which may represent a strain. There was no meniscal tear evident. 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 We discussed activities. He likes to hike and do things of that nature and we discussed consideration with that. We talked about the natural history of osteoarthritis. I advised to continue moderate activity and pay attention to his symptoms. I do not think anything further at this time needs to be done and I will see him back as needed.     Vanessa Montes MD

## 2023-10-24 ENCOUNTER — TELEPHONE (OUTPATIENT)
Dept: INTERNAL MEDICINE CLINIC | Facility: CLINIC | Age: 73
End: 2023-10-24

## 2023-10-24 DIAGNOSIS — Z12.5 SCREENING FOR PROSTATE CANCER: ICD-10-CM

## 2023-10-24 DIAGNOSIS — E78.2 MIXED HYPERLIPIDEMIA: Primary | ICD-10-CM

## 2023-10-24 NOTE — TELEPHONE ENCOUNTER
----- Message from Lencho Efren sent at 10/24/2023 11:41 AM EDT -----  Subject: Referral Request    Reason for referral request? labs for appointment on 11/7/23, need to be   done week prior to appointment  Provider patient wants to be referred to(if known):     Provider Phone Number(if known): Additional Information for Provider?  Please call patient to get these   scheduled once orders have been placed  ---------------------------------------------------------------------------  --------------  Destiney Rasmussen INFO    [phone]; OK to leave message on voicemail  ---------------------------------------------------------------------------  --------------

## 2023-10-31 ENCOUNTER — HOSPITAL ENCOUNTER (OUTPATIENT)
Dept: LAB | Age: 73
Discharge: HOME | End: 2023-11-03

## 2023-10-31 DIAGNOSIS — E78.2 MIXED HYPERLIPIDEMIA: ICD-10-CM

## 2023-10-31 DIAGNOSIS — Z12.5 SCREENING FOR PROSTATE CANCER: ICD-10-CM

## 2023-10-31 LAB
ALBUMIN SERPL-MCNC: 3.7 G/DL (ref 3.2–4.6)
ALBUMIN/GLOB SERPL: 1.2 (ref 0.4–1.6)
ALP SERPL-CCNC: 46 U/L (ref 50–136)
ALT SERPL-CCNC: 29 U/L (ref 12–65)
ANION GAP SERPL CALC-SCNC: 7 MMOL/L (ref 2–11)
AST SERPL-CCNC: 23 U/L (ref 15–37)
BASOPHILS # BLD: 0.1 K/UL (ref 0–0.2)
BASOPHILS NFR BLD: 1 % (ref 0–2)
BILIRUB SERPL-MCNC: 0.8 MG/DL (ref 0.2–1.1)
BUN SERPL-MCNC: 17 MG/DL (ref 8–23)
CALCIUM SERPL-MCNC: 9.5 MG/DL (ref 8.3–10.4)
CHLORIDE SERPL-SCNC: 107 MMOL/L (ref 101–110)
CHOLEST SERPL-MCNC: 199 MG/DL
CO2 SERPL-SCNC: 25 MMOL/L (ref 21–32)
CREAT SERPL-MCNC: 1.3 MG/DL (ref 0.8–1.5)
DIFFERENTIAL METHOD BLD: ABNORMAL
EOSINOPHIL # BLD: 0.3 K/UL (ref 0–0.8)
EOSINOPHIL NFR BLD: 3 % (ref 0.5–7.8)
ERYTHROCYTE [DISTWIDTH] IN BLOOD BY AUTOMATED COUNT: 14.2 % (ref 11.9–14.6)
GLOBULIN SER CALC-MCNC: 3.1 G/DL (ref 2.8–4.5)
GLUCOSE SERPL-MCNC: 100 MG/DL (ref 65–100)
HCT VFR BLD AUTO: 47.3 % (ref 41.1–50.3)
HDLC SERPL-MCNC: 52 MG/DL (ref 40–60)
HDLC SERPL: 3.8
HGB BLD-MCNC: 15.7 G/DL (ref 13.6–17.2)
IMM GRANULOCYTES # BLD AUTO: 0 K/UL (ref 0–0.5)
IMM GRANULOCYTES NFR BLD AUTO: 0 % (ref 0–5)
LDLC SERPL CALC-MCNC: 127.6 MG/DL
LYMPHOCYTES # BLD: 3.2 K/UL (ref 0.5–4.6)
LYMPHOCYTES NFR BLD: 30 % (ref 13–44)
MCH RBC QN AUTO: 31.5 PG (ref 26.1–32.9)
MCHC RBC AUTO-ENTMCNC: 33.2 G/DL (ref 31.4–35)
MCV RBC AUTO: 95 FL (ref 82–102)
MONOCYTES # BLD: 1.4 K/UL (ref 0.1–1.3)
MONOCYTES NFR BLD: 13 % (ref 4–12)
NEUTS SEG # BLD: 5.7 K/UL (ref 1.7–8.2)
NEUTS SEG NFR BLD: 53 % (ref 43–78)
NRBC # BLD: 0 K/UL (ref 0–0.2)
PLATELET # BLD AUTO: 275 K/UL (ref 150–450)
PMV BLD AUTO: 10.4 FL (ref 9.4–12.3)
POTASSIUM SERPL-SCNC: 4.4 MMOL/L (ref 3.5–5.1)
PROT SERPL-MCNC: 6.8 G/DL (ref 6.3–8.2)
PSA SERPL-MCNC: 2 NG/ML
RBC # BLD AUTO: 4.98 M/UL (ref 4.23–5.6)
SODIUM SERPL-SCNC: 139 MMOL/L (ref 133–143)
TRIGL SERPL-MCNC: 97 MG/DL (ref 35–150)
TSH, 3RD GENERATION: 2.23 UIU/ML (ref 0.36–3.74)
VLDLC SERPL CALC-MCNC: 19.4 MG/DL (ref 6–23)
WBC # BLD AUTO: 10.6 K/UL (ref 4.3–11.1)

## 2023-11-07 ENCOUNTER — OFFICE VISIT (OUTPATIENT)
Dept: INTERNAL MEDICINE CLINIC | Facility: CLINIC | Age: 73
End: 2023-11-07
Payer: MEDICARE

## 2023-11-07 VITALS
HEART RATE: 84 BPM | DIASTOLIC BLOOD PRESSURE: 74 MMHG | OXYGEN SATURATION: 97 % | WEIGHT: 186 LBS | TEMPERATURE: 98.1 F | HEIGHT: 71 IN | SYSTOLIC BLOOD PRESSURE: 136 MMHG | BODY MASS INDEX: 26.04 KG/M2

## 2023-11-07 DIAGNOSIS — Z71.89 ACP (ADVANCE CARE PLANNING): ICD-10-CM

## 2023-11-07 DIAGNOSIS — Z12.5 ENCOUNTER FOR PROSTATE CANCER SCREENING: ICD-10-CM

## 2023-11-07 DIAGNOSIS — Z00.00 MEDICARE ANNUAL WELLNESS VISIT, SUBSEQUENT: Primary | ICD-10-CM

## 2023-11-07 DIAGNOSIS — R35.1 BENIGN PROSTATIC HYPERPLASIA WITH NOCTURIA: ICD-10-CM

## 2023-11-07 DIAGNOSIS — N40.1 BENIGN PROSTATIC HYPERPLASIA WITH NOCTURIA: ICD-10-CM

## 2023-11-07 DIAGNOSIS — M17.12 PRIMARY OSTEOARTHRITIS OF LEFT KNEE: ICD-10-CM

## 2023-11-07 LAB
BILIRUBIN, URINE, POC: NEGATIVE
BLOOD URINE, POC: NEGATIVE
GLUCOSE URINE, POC: NEGATIVE
KETONES, URINE, POC: NEGATIVE
LEUKOCYTE ESTERASE, URINE, POC: NEGATIVE
NITRITE, URINE, POC: NEGATIVE
PH, URINE, POC: 6 (ref 4.6–8)
PROTEIN,URINE, POC: NEGATIVE
SPECIFIC GRAVITY, URINE, POC: 1.02 (ref 1–1.03)
URINALYSIS CLARITY, POC: CLEAR
URINALYSIS COLOR, POC: YELLOW
UROBILINOGEN, POC: NORMAL

## 2023-11-07 PROCEDURE — G8484 FLU IMMUNIZE NO ADMIN: HCPCS | Performed by: INTERNAL MEDICINE

## 2023-11-07 PROCEDURE — 3017F COLORECTAL CA SCREEN DOC REV: CPT | Performed by: INTERNAL MEDICINE

## 2023-11-07 PROCEDURE — G8427 DOCREV CUR MEDS BY ELIG CLIN: HCPCS | Performed by: INTERNAL MEDICINE

## 2023-11-07 PROCEDURE — G0439 PPPS, SUBSEQ VISIT: HCPCS | Performed by: INTERNAL MEDICINE

## 2023-11-07 PROCEDURE — 1036F TOBACCO NON-USER: CPT | Performed by: INTERNAL MEDICINE

## 2023-11-07 PROCEDURE — 81003 URINALYSIS AUTO W/O SCOPE: CPT | Performed by: INTERNAL MEDICINE

## 2023-11-07 PROCEDURE — 1123F ACP DISCUSS/DSCN MKR DOCD: CPT | Performed by: INTERNAL MEDICINE

## 2023-11-07 PROCEDURE — 99497 ADVNCD CARE PLAN 30 MIN: CPT | Performed by: INTERNAL MEDICINE

## 2023-11-07 PROCEDURE — 99213 OFFICE O/P EST LOW 20 MIN: CPT | Performed by: INTERNAL MEDICINE

## 2023-11-07 PROCEDURE — G8417 CALC BMI ABV UP PARAM F/U: HCPCS | Performed by: INTERNAL MEDICINE

## 2023-11-07 PROCEDURE — G0102 PROSTATE CA SCREENING; DRE: HCPCS | Performed by: INTERNAL MEDICINE

## 2023-11-07 ASSESSMENT — PATIENT HEALTH QUESTIONNAIRE - PHQ9
SUM OF ALL RESPONSES TO PHQ QUESTIONS 1-9: 0
1. LITTLE INTEREST OR PLEASURE IN DOING THINGS: 0
SUM OF ALL RESPONSES TO PHQ QUESTIONS 1-9: 0
2. FEELING DOWN, DEPRESSED OR HOPELESS: 0
SUM OF ALL RESPONSES TO PHQ QUESTIONS 1-9: 0
SUM OF ALL RESPONSES TO PHQ9 QUESTIONS 1 & 2: 0
SUM OF ALL RESPONSES TO PHQ QUESTIONS 1-9: 0

## 2023-11-07 ASSESSMENT — LIFESTYLE VARIABLES
HOW OFTEN DO YOU HAVE A DRINK CONTAINING ALCOHOL: MONTHLY OR LESS
HOW MANY STANDARD DRINKS CONTAINING ALCOHOL DO YOU HAVE ON A TYPICAL DAY: 1 OR 2

## 2023-11-07 NOTE — PATIENT INSTRUCTIONS
Learning About Alexx Gutiérrez  What is a living will? A living will, also called a declaration, is a legal form. It tells your family and your doctor your wishes when you can't speak for yourself. It's used by the health professionals who will treat you as you near the end of your life or if you get seriously hurt or ill. If you put your wishes in writing, your loved ones and others will know what kind of care you want. They won't need to guess. This can ease your mind and be helpful to others. And you can change or cancel your living will at any time. A living will is not the same as an estate or property will. An estate will explains what you want to happen with your money and property after you die. How do you use it? Keep these facts in mind about how a living will is used. Your living will is used only if you can't speak or make decisions for yourself. Most often, one or more doctors must certify that you can't speak or decide for yourself before your living will takes effect. If you get better and can speak for yourself again, you can accept or refuse any treatment. It doesn't matter what you said in your living will. Some states may limit your right to refuse treatment in certain cases. For example, you may need to clearly state in your living will that you don't want artificial hydration and nutrition, such as being fed through a tube. Is a living will a legal document? A living will is a legal document. Each state has its own laws about living scott. And a living will may be called something else in your state. Here are some things to know about living scott. You don't need an  to complete a living will. But legal advice can be helpful if your state's laws are unclear. It can also help if your health history is complicated or your family can't agree on what should be in your living will. You can change your living will at any time.  Some people find that their wishes about

## 2023-11-07 NOTE — PROGRESS NOTES
Medicare Annual Wellness Visit    Wesley Rucker is here for Medicare AWV, Cholesterol Problem, and Benign Prostatic Hypertrophy    Assessment & Plan   Medicare annual wellness visit, subsequent-declines vaccines. ACP (advance care planning)-  See note. Primary osteoarthritis of left knee-  Seeing orthopedics with Dr. Franklyn Perez. Benign prostatic hyperplasia with nocturia-digital rectal exam was completed. No severe prostate enlargement on exam no nodularity. Recommend trial of Myrbetriq for overactive bladder symptoms. Continue to work on alternating constipation and diarrhea with adequate fiber supplementation and stool softeners. Constipation can worsen. Flomax was considered but it sounds like he has tried this before and did not have much success of number of years ago. We will try Myrbetriq 25 mg daily to see if helps with urine frequency? Urinalysis is recommended to collect today. -     PROSTATE CA SCREENING; VASU  -     AMB POC URINALYSIS DIP STICK AUTO W/O MICRO  -     mirabegron (MYRBETRIQ) 25 MG TB24; Take 1 tablet by mouth daily, Disp-30 tablet, R-5Normal  Encounter for prostate cancer screening  -     PROSTATE CA SCREENING; VAUS    On this date, 11/7/23, outside of the AWV I have spent 30 minutes reviewing previous notes, test results and face to face with the patient discussing the diagnosis and importance of compliance with the treatment plan as well as documenting on the day of the visit. An electronic signature was used to authenticate this note. -- Tree Correia MD     Recommendations for Preventive Services Due: see orders and patient instructions/AVS.  Recommended screening schedule for the next 5-10 years is provided to the patient in written form: see Patient Instructions/AVS.     Return in 2 months (on 1/7/2024), or if symptoms worsen or fail to improve, for BPH follow up in about 2-3 months or sooner if needed to evaluate.      Subjective   The following acute and/or chronic

## 2024-01-04 ENCOUNTER — OFFICE VISIT (OUTPATIENT)
Dept: INTERNAL MEDICINE CLINIC | Facility: CLINIC | Age: 74
End: 2024-01-04
Payer: MEDICARE

## 2024-01-04 VITALS
BODY MASS INDEX: 26.32 KG/M2 | HEIGHT: 71 IN | SYSTOLIC BLOOD PRESSURE: 126 MMHG | DIASTOLIC BLOOD PRESSURE: 74 MMHG | HEART RATE: 68 BPM | TEMPERATURE: 97.8 F | WEIGHT: 188 LBS | OXYGEN SATURATION: 99 %

## 2024-01-04 DIAGNOSIS — Z87.19 HISTORY OF COLITIS: ICD-10-CM

## 2024-01-04 DIAGNOSIS — N40.1 BENIGN PROSTATIC HYPERPLASIA WITH INCOMPLETE BLADDER EMPTYING: Primary | ICD-10-CM

## 2024-01-04 DIAGNOSIS — R39.14 BENIGN PROSTATIC HYPERPLASIA WITH INCOMPLETE BLADDER EMPTYING: Primary | ICD-10-CM

## 2024-01-04 PROCEDURE — 99213 OFFICE O/P EST LOW 20 MIN: CPT | Performed by: INTERNAL MEDICINE

## 2024-01-04 PROCEDURE — G8484 FLU IMMUNIZE NO ADMIN: HCPCS | Performed by: INTERNAL MEDICINE

## 2024-01-04 PROCEDURE — G8417 CALC BMI ABV UP PARAM F/U: HCPCS | Performed by: INTERNAL MEDICINE

## 2024-01-04 PROCEDURE — 1036F TOBACCO NON-USER: CPT | Performed by: INTERNAL MEDICINE

## 2024-01-04 PROCEDURE — 1123F ACP DISCUSS/DSCN MKR DOCD: CPT | Performed by: INTERNAL MEDICINE

## 2024-01-04 PROCEDURE — G8427 DOCREV CUR MEDS BY ELIG CLIN: HCPCS | Performed by: INTERNAL MEDICINE

## 2024-01-04 PROCEDURE — 3017F COLORECTAL CA SCREEN DOC REV: CPT | Performed by: INTERNAL MEDICINE

## 2024-01-04 RX ORDER — AMOXICILLIN AND CLAVULANATE POTASSIUM 875; 125 MG/1; MG/1
1 TABLET, FILM COATED ORAL 2 TIMES DAILY
Qty: 20 TABLET | Refills: 0 | Status: SHIPPED | OUTPATIENT
Start: 2024-01-04 | End: 2024-01-14

## 2024-01-04 RX ORDER — SILODOSIN 4 MG/1
4 CAPSULE ORAL EVERY EVENING
Qty: 30 CAPSULE | Refills: 11 | Status: SHIPPED | OUTPATIENT
Start: 2024-01-04

## 2024-01-04 RX ORDER — METRONIDAZOLE 500 MG/1
500 TABLET ORAL 2 TIMES DAILY
Qty: 14 TABLET | Refills: 0 | Status: SHIPPED | OUTPATIENT
Start: 2024-01-04 | End: 2024-01-11

## 2024-01-04 NOTE — PROGRESS NOTES
ASSESSMENT/PLAN:        Evaluation and management of the chronic condition(s) delineated.  No negative side effects reported.  I have reviewed all the lab results. There are some abnormalities that are either expected or not critical to the patient's health, and are discussed in the office today and are addressed.  Please refer to the above assessement and plan narrative and orders and follow up plan.  Medication discussed and refilled as needed.  Physical exam findings are stable unless otherwise indicated and this is addressed.  The most recent lab work and imaging and consultant/urgent care visits and imaging are reviewed and discussed and considered during this visit encounter.      Triston was seen today for benign prostatic hypertrophy.    Diagnoses and all orders for this visit:    Benign prostatic hyperplasia with incomplete bladder emptying  -     silodosin (RAPAFLO) 4 MG CAPS capsule; Take 1 capsule by mouth every evening  -     Basic Metabolic Panel; Future  -     Urinalysis; Future    History of colitis  -     amoxicillin-clavulanate (AUGMENTIN) 875-125 MG per tablet; Take 1 tablet by mouth 2 times daily for 10 days  -     metroNIDAZOLE (FLAGYL) 500 MG tablet; Take 1 tablet by mouth 2 times daily for 7 days            On this date, 1/4/24, I have spent 20 minutes reviewing previous notes, test results and face to face with the patient discussing the diagnosis and importance of compliance with the treatment plan as well as documenting on the day of the visit.     An electronic signature was used to authenticate this note.  -- Ric Salinas MD     Return in about 3 months (around 4/4/2024), or if symptoms worsen or fail to improve.    SUBJECTIVE/OBJECTIVE:    HPI:   Chief Complaint   Patient presents with    Benign Prostatic Hypertrophy     2 month recheck, started Myrbetriq 25 mg, Patient states medication was not effective     Triston Thompson 73 y.o. White (non-) male is here for follow-up and

## 2024-01-04 NOTE — PATIENT INSTRUCTIONS
Since Myrbetriq not much help after trial, will recommend trial of Rapaflo once daily.  If you have some improvement with Rapaflo with regards to urine flow and bladder emptying, but still have some overactive bladder symptoms, you may try Myrbetriq as well in addition to the Rapaflo.      If no improvement at all and you would like to discuss surgical options with urology, please let me know and I can arrange this with a trusted specialist.      Augmentin and Flagyl written to have on hand if you develop Colitis while traveling.      BMP and Urinalysis in 3 months prior to follow up visit.     MENDY RUIZ MD

## 2024-02-14 ENCOUNTER — TELEPHONE (OUTPATIENT)
Dept: INTERNAL MEDICINE CLINIC | Facility: CLINIC | Age: 74
End: 2024-02-14

## 2024-02-14 NOTE — TELEPHONE ENCOUNTER
Received fax from patients prescription drug coverage, they are no longer covering Silodosin. They will cover tamsulosin, terzosin, doxazosin, and Afluzosin ER.

## 2024-11-05 DIAGNOSIS — Z12.5 PROSTATE CANCER SCREENING: ICD-10-CM

## 2024-11-05 DIAGNOSIS — E78.2 MIXED HYPERLIPIDEMIA: Primary | ICD-10-CM

## 2024-11-12 ENCOUNTER — LAB (OUTPATIENT)
Dept: INTERNAL MEDICINE CLINIC | Facility: CLINIC | Age: 74
End: 2024-11-12

## 2024-11-12 DIAGNOSIS — E78.2 MIXED HYPERLIPIDEMIA: ICD-10-CM

## 2024-11-12 DIAGNOSIS — Z12.5 PROSTATE CANCER SCREENING: ICD-10-CM

## 2024-11-12 LAB
ALBUMIN SERPL-MCNC: 3.8 G/DL (ref 3.2–4.6)
ALBUMIN/GLOB SERPL: 1.3 (ref 1–1.9)
ALP SERPL-CCNC: 54 U/L (ref 40–129)
ALT SERPL-CCNC: 22 U/L (ref 8–55)
ANION GAP SERPL CALC-SCNC: 10 MMOL/L (ref 7–16)
AST SERPL-CCNC: 25 U/L (ref 15–37)
BASOPHILS # BLD: 0.1 K/UL (ref 0–0.2)
BASOPHILS NFR BLD: 1 % (ref 0–2)
BILIRUB SERPL-MCNC: 0.6 MG/DL (ref 0–1.2)
BUN SERPL-MCNC: 18 MG/DL (ref 8–23)
CALCIUM SERPL-MCNC: 9.4 MG/DL (ref 8.8–10.2)
CHLORIDE SERPL-SCNC: 101 MMOL/L (ref 98–107)
CHOLEST SERPL-MCNC: 195 MG/DL (ref 0–200)
CO2 SERPL-SCNC: 28 MMOL/L (ref 20–29)
CREAT SERPL-MCNC: 1.16 MG/DL (ref 0.8–1.3)
DIFFERENTIAL METHOD BLD: ABNORMAL
EOSINOPHIL # BLD: 0.4 K/UL (ref 0–0.8)
EOSINOPHIL NFR BLD: 5 % (ref 0.5–7.8)
ERYTHROCYTE [DISTWIDTH] IN BLOOD BY AUTOMATED COUNT: 14.7 % (ref 11.9–14.6)
GLOBULIN SER CALC-MCNC: 3 G/DL (ref 2.3–3.5)
GLUCOSE SERPL-MCNC: 95 MG/DL (ref 70–99)
HCT VFR BLD AUTO: 47.3 % (ref 41.1–50.3)
HDLC SERPL-MCNC: 48 MG/DL (ref 40–60)
HDLC SERPL: 4.1 (ref 0–5)
HGB BLD-MCNC: 15.4 G/DL (ref 13.6–17.2)
IMM GRANULOCYTES # BLD AUTO: 0 K/UL (ref 0–0.5)
IMM GRANULOCYTES NFR BLD AUTO: 0 % (ref 0–5)
LDLC SERPL CALC-MCNC: 126 MG/DL (ref 0–100)
LYMPHOCYTES # BLD: 2.9 K/UL (ref 0.5–4.6)
LYMPHOCYTES NFR BLD: 32 % (ref 13–44)
MCH RBC QN AUTO: 31.4 PG (ref 26.1–32.9)
MCHC RBC AUTO-ENTMCNC: 32.6 G/DL (ref 31.4–35)
MCV RBC AUTO: 96.3 FL (ref 82–102)
MONOCYTES # BLD: 1.2 K/UL (ref 0.1–1.3)
MONOCYTES NFR BLD: 13 % (ref 4–12)
NEUTS SEG # BLD: 4.6 K/UL (ref 1.7–8.2)
NEUTS SEG NFR BLD: 49 % (ref 43–78)
NRBC # BLD: 0 K/UL (ref 0–0.2)
PLATELET # BLD AUTO: 280 K/UL (ref 150–450)
PMV BLD AUTO: 10.7 FL (ref 9.4–12.3)
POTASSIUM SERPL-SCNC: 4.5 MMOL/L (ref 3.5–5.1)
PROT SERPL-MCNC: 6.8 G/DL (ref 6.3–8.2)
PSA SERPL-MCNC: 2.1 NG/ML (ref 0–4)
RBC # BLD AUTO: 4.91 M/UL (ref 4.23–5.6)
SODIUM SERPL-SCNC: 138 MMOL/L (ref 136–145)
TRIGL SERPL-MCNC: 103 MG/DL (ref 0–150)
TSH, 3RD GENERATION: 2.81 UIU/ML (ref 0.27–4.2)
VLDLC SERPL CALC-MCNC: 21 MG/DL (ref 6–23)
WBC # BLD AUTO: 9.2 K/UL (ref 4.3–11.1)

## 2024-11-19 ENCOUNTER — OFFICE VISIT (OUTPATIENT)
Dept: INTERNAL MEDICINE CLINIC | Facility: CLINIC | Age: 74
End: 2024-11-19

## 2024-11-19 VITALS
HEIGHT: 71 IN | WEIGHT: 189 LBS | OXYGEN SATURATION: 97 % | HEART RATE: 72 BPM | DIASTOLIC BLOOD PRESSURE: 62 MMHG | SYSTOLIC BLOOD PRESSURE: 128 MMHG | BODY MASS INDEX: 26.46 KG/M2 | TEMPERATURE: 97.8 F

## 2024-11-19 DIAGNOSIS — Z00.00 MEDICARE ANNUAL WELLNESS VISIT, SUBSEQUENT: Primary | ICD-10-CM

## 2024-11-19 DIAGNOSIS — N40.1 BENIGN PROSTATIC HYPERPLASIA WITH INCOMPLETE BLADDER EMPTYING: ICD-10-CM

## 2024-11-19 DIAGNOSIS — M25.562 CHRONIC PAIN OF LEFT KNEE: ICD-10-CM

## 2024-11-19 DIAGNOSIS — Z12.5 ENCOUNTER FOR PROSTATE CANCER SCREENING: ICD-10-CM

## 2024-11-19 DIAGNOSIS — G89.29 CHRONIC PAIN OF LEFT KNEE: ICD-10-CM

## 2024-11-19 DIAGNOSIS — R39.14 BENIGN PROSTATIC HYPERPLASIA WITH INCOMPLETE BLADDER EMPTYING: ICD-10-CM

## 2024-11-19 DIAGNOSIS — K57.90 DIVERTICULOSIS: ICD-10-CM

## 2024-11-19 DIAGNOSIS — K42.9 UMBILICAL HERNIA WITHOUT OBSTRUCTION AND WITHOUT GANGRENE: ICD-10-CM

## 2024-11-19 DIAGNOSIS — E78.2 MODERATE MIXED HYPERLIPIDEMIA NOT REQUIRING STATIN THERAPY: ICD-10-CM

## 2024-11-19 ASSESSMENT — LIFESTYLE VARIABLES
HOW OFTEN DO YOU HAVE A DRINK CONTAINING ALCOHOL: NEVER
HOW MANY STANDARD DRINKS CONTAINING ALCOHOL DO YOU HAVE ON A TYPICAL DAY: PATIENT DOES NOT DRINK

## 2024-11-19 NOTE — PATIENT INSTRUCTIONS
HIGH FIBER DIET PATIENT INSTRUCTIONS    For a high-fiber diet, here are some instructions and tips to help you incorporate more fiber into your meals effectively:  What is a High-Fiber Diet?    A high-fiber diet typically includes 25 to 30 grams of fiber per day. Fiber is essential for digestive health and can help regulate blood sugar levels, lower cholesterol, and promote a feeling of fullness.    Dietary Recommendations:    Choose Whole Grains:  Opt for whole grain bread, brown rice, quinoa, oats, and whole grain pasta instead of refined grains.  Increase Fruits and Vegetables:  Aim for at least 5 servings of fruits and vegetables daily. Include a variety of colors and types for maximum nutrients.  Examples: Berries, apples, oranges, broccoli, carrots, leafy greens.  Incorporate Legumes:  Beans, lentils, and peas are excellent sources of fiber. Add them to soups, salads, and main dishes.  Aim for at least 1 cup of legumes per week.  Snack on Nuts and Seeds:  Almonds, antionette seeds, flaxseeds, and walnuts are high in fiber. Use them in yogurt, salads, or as snacks.  Add Fiber Gradually:  Increase your fiber intake slowly over several days to prevent digestive discomfort.  Stay Hydrated:  Drink plenty of water throughout the day, as fiber absorbs water and helps with digestion.       Fiber Supplement Recommendations:    Stay Hydrated: Increase fluid intake when using these products, as they can absorb water and help facilitate bowel movements.    -Miralax (Polyethylene Glycol 3350)    Typical Adult Dose:  17 grams (1 capful or 1 scoop) dissolved in 8 ounces of water, juice, or another liquid once daily.  Use: Generally used for occasional constipation.  Duration: Can be used for up to 7 days for constipation relief unless directed otherwise by a healthcare provider.      -Metamucil (Psyllium Husk)    Typical Adult Dose:  For Fiber Supplementation:  1 tablespoon (about 12 grams) in 8 ounces of water, taken 1 to 3

## 2024-11-19 NOTE — PROGRESS NOTES
Skin is not jaundiced or pale.   Neurological:      General: No focal deficit present.      Mental Status: He is alert and oriented to person, place, and time. Mental status is at baseline.      Cranial Nerves: No cranial nerve deficit.   Psychiatric:         Mood and Affect: Mood normal.         Behavior: Behavior normal.         Thought Content: Thought content normal.         Judgment: Judgment normal.                  Allergies   Allergen Reactions    Azithromycin Diarrhea    Levofloxacin Other (See Comments)     Confusion    Omeprazole Other (See Comments)     Affected pt's kidney function  Other reaction(s): Other (see comments)  Affected pt's kidney function     Prior to Visit Medications    Medication Sig Taking? Authorizing Provider   Multiple Vitamins-Minerals (MULTI COMPLETE PO) Take by mouth Yes Provider, MD Jaclyn   GARLIC PO Take by mouth daily Yes Automatic Reconciliation, Ar       CareTeam (Including outside providers/suppliers regularly involved in providing care):   Patient Care Team:  Ric Salinas MD as PCP - General  Ric Salinas MD as PCP - Empaneled Provider      Reviewed and updated this visit:  Tobacco  Allergies  Meds  Problems  Med Hx  Surg Hx  Soc Hx  Fam Hx              The patient (or guardian, if applicable) and other individuals in attendance with the patient were advised that Artificial Intelligence will be utilized during this visit to record and process the conversation to generate a clinical note. The patient (or guardian, if applicable) and other individuals in attendance at the appointment consented to the use of AI, including the recording.

## 2024-11-19 NOTE — ASSESSMENT & PLAN NOTE
Chronic, at goal (stable), continue current treatment plan  He reports mild pain in his left knee and is currently seeing an orthopedic specialist. He was advised to continue staying active but to limit his hiking to a maximum of 5 miles to avoid exacerbating the pain.

## 2024-11-19 NOTE — ASSESSMENT & PLAN NOTE
Prefers to not take statin    Lab Results   Component Value Date    CHOL 195 11/12/2024    TRIG 103 11/12/2024    HDL 48 11/12/2024     (H) 11/12/2024    VLDL 21 11/12/2024    CHOLHDLRATIO 4.1 11/12/2024     The patient is asked to make an attempt to improve diet and exercise patterns to aid in medical management of this problem.    Statin could be considered to treat LDL    Literature reviewed and considered in regards to LDL management and treatment with statin medications; considering current age, comorbid medical conditions; considering the risks vs benefits    Natalie JENKINS, Jose Alfredo GIBBS, Alvaro OG, Chong Tejeda A, Flaco DG, Diateeu CC. Treatment with Statins in Elderly Patients. [facility] ([facility]). 2019 Oct 30;55(11):721.    Rina K, Kam SH. Effects of Statins for Primary Prevention in the Elderly: Recent Evidence. J Lipid Atheroscler. 2020 Jan;9(1):1-7.    Anai MG, Salud A, Raphael MB, Nori AM. Primary prevention statin therapy in older adults. Curr Opin Cardiol. 2023 Jan 1;38(1):11-20

## 2024-11-19 NOTE — ASSESSMENT & PLAN NOTE
He reports feeling bloated most of the time and has been using Colace occasionally. He was advised to continue his current regimen of fiber intake and Colace as needed. He also mentioned using Senokot and eating prunes daily. He was reassured that using a colonoscopy preparation medication every 3 to 6 months to clean out his system is acceptable.

## 2024-11-19 NOTE — ASSESSMENT & PLAN NOTE
New, uncertain prognosis, A small umbilical hernia was identified upon examination. Given its current non-bothersome nature, a watchful waiting approach was recommended. He was advised to seek immediate medical attention if the hernia becomes more bothersome or causes severe pain. He was also informed about the potential for bowel loops to get stuck, which would require emergent care.    If he would like surgical opinion, he will let me know.

## 2025-04-10 ENCOUNTER — TELEPHONE (OUTPATIENT)
Dept: INTERNAL MEDICINE CLINIC | Facility: CLINIC | Age: 75
End: 2025-04-10

## 2025-04-10 NOTE — TELEPHONE ENCOUNTER
Spoke with the patient's ophthalmologist on the phone when he called his reported the patient had an episode of left-sided numbness issue that resolved.  Apparently the episode happened 10 to 12 days ago?  I advised to start the patient on 81 mg of aspirin and if symptoms change will return to proceed to the emergency room immediately and/or call 911.  I will arrange for the patient to be seen in the office as soon as possible.    MENDY RUIZ MD

## 2025-04-14 ENCOUNTER — OFFICE VISIT (OUTPATIENT)
Dept: INTERNAL MEDICINE CLINIC | Facility: CLINIC | Age: 75
End: 2025-04-14
Payer: MEDICARE

## 2025-04-14 VITALS
OXYGEN SATURATION: 96 % | WEIGHT: 191 LBS | HEART RATE: 72 BPM | BODY MASS INDEX: 26.74 KG/M2 | HEIGHT: 71 IN | TEMPERATURE: 97.5 F | DIASTOLIC BLOOD PRESSURE: 80 MMHG | SYSTOLIC BLOOD PRESSURE: 126 MMHG

## 2025-04-14 DIAGNOSIS — R29.898 TRANSIENT WEAKNESS OF LEFT LEG: ICD-10-CM

## 2025-04-14 DIAGNOSIS — K57.90 DIVERTICULOSIS: ICD-10-CM

## 2025-04-14 DIAGNOSIS — E78.2 MODERATE MIXED HYPERLIPIDEMIA NOT REQUIRING STATIN THERAPY: ICD-10-CM

## 2025-04-14 DIAGNOSIS — K44.9 HIATAL HERNIA: ICD-10-CM

## 2025-04-14 DIAGNOSIS — K42.9 UMBILICAL HERNIA WITHOUT OBSTRUCTION AND WITHOUT GANGRENE: ICD-10-CM

## 2025-04-14 DIAGNOSIS — R20.0 LEFT ARM NUMBNESS: ICD-10-CM

## 2025-04-14 DIAGNOSIS — R29.898 LEFT ARM WEAKNESS: ICD-10-CM

## 2025-04-14 DIAGNOSIS — G45.9 TIA (TRANSIENT ISCHEMIC ATTACK): Primary | ICD-10-CM

## 2025-04-14 DIAGNOSIS — K21.9 GASTROESOPHAGEAL REFLUX DISEASE WITHOUT ESOPHAGITIS: ICD-10-CM

## 2025-04-14 PROCEDURE — 1159F MED LIST DOCD IN RCRD: CPT | Performed by: INTERNAL MEDICINE

## 2025-04-14 PROCEDURE — G8417 CALC BMI ABV UP PARAM F/U: HCPCS | Performed by: INTERNAL MEDICINE

## 2025-04-14 PROCEDURE — 93000 ELECTROCARDIOGRAM COMPLETE: CPT | Performed by: INTERNAL MEDICINE

## 2025-04-14 PROCEDURE — G8427 DOCREV CUR MEDS BY ELIG CLIN: HCPCS | Performed by: INTERNAL MEDICINE

## 2025-04-14 PROCEDURE — 1036F TOBACCO NON-USER: CPT | Performed by: INTERNAL MEDICINE

## 2025-04-14 PROCEDURE — 99215 OFFICE O/P EST HI 40 MIN: CPT | Performed by: INTERNAL MEDICINE

## 2025-04-14 PROCEDURE — G2211 COMPLEX E/M VISIT ADD ON: HCPCS | Performed by: INTERNAL MEDICINE

## 2025-04-14 PROCEDURE — 1160F RVW MEDS BY RX/DR IN RCRD: CPT | Performed by: INTERNAL MEDICINE

## 2025-04-14 PROCEDURE — 1123F ACP DISCUSS/DSCN MKR DOCD: CPT | Performed by: INTERNAL MEDICINE

## 2025-04-14 RX ORDER — OMEG3/DHA/EPA/FISH OIL/L.CASEI 120-400MG
1 CAPSULE ORAL DAILY
Qty: 10 CAPSULE | Refills: 0 | Status: SHIPPED | COMMUNITY
Start: 2025-04-14

## 2025-04-14 SDOH — ECONOMIC STABILITY: FOOD INSECURITY: WITHIN THE PAST 12 MONTHS, YOU WORRIED THAT YOUR FOOD WOULD RUN OUT BEFORE YOU GOT MONEY TO BUY MORE.: NEVER TRUE

## 2025-04-14 SDOH — ECONOMIC STABILITY: FOOD INSECURITY: WITHIN THE PAST 12 MONTHS, THE FOOD YOU BOUGHT JUST DIDN'T LAST AND YOU DIDN'T HAVE MONEY TO GET MORE.: NEVER TRUE

## 2025-04-14 ASSESSMENT — PATIENT HEALTH QUESTIONNAIRE - PHQ9
1. LITTLE INTEREST OR PLEASURE IN DOING THINGS: NOT AT ALL
SUM OF ALL RESPONSES TO PHQ QUESTIONS 1-9: 0
2. FEELING DOWN, DEPRESSED OR HOPELESS: NOT AT ALL

## 2025-04-14 NOTE — PROGRESS NOTES
Chief Complaint   Patient presents with    Numbness     Left arm,hand,leg and foot,which lasted about 3 days       Triston Thompson 74 y.o. male   History of Present Illness  Triston Thompson 74 y.o. male presents for evaluation of transient ischemic attack (TIA).      He had been seen and evaluated by Dr. Wang (ophthalmology) and we spoke on the phone 4/11/2024 about the patient's symptoms.  The patient was worked in to be seen into the schedule today.  The patient was brought in to be seen and evaluated after our discussion.  An EKG was obtained.    On 03/14/2025, he experienced numbness in his left leg upon awakening, which he initially attributed to his sleeping position. The numbness extended to his left hand, arm, foot, and leg, with an estimated severity of 50% to 60%. Despite the numbness, he was able to walk without difficulty. The numbness persisted beyond his expectation of 30 minutes but gradually improved throughout the day, reducing to approximately 40%. No facial drooping or other symptoms were reported. The following day, he engaged in lawn mowing for about 30 minutes, during which he noticed a slight increase in numbness but no other issues. He hypothesized that the numbness could be related to his pillow, which he had been changing weekly for the past six months. After switching to a new pillow, the numbness resolved completely within five days. He consulted a chiropractor on 03/24/2025 or 03/25/2025, who suggested that a slight misalignment in his neck spine could have caused the numbness.    No temple or eye pain or acute vision changes    On 04/10/2025, he experienced numbness in his left leg after sitting on the toilet for 20 to 25 minutes. Upon standing, his left leg collapsed, but he was able to regain function within two minutes. The numbness resolved completely within five minutes. No current weakness is reported.He reported no nausea, vomiting, dizziness, difficulty swallowing,

## 2025-04-14 NOTE — PATIENT INSTRUCTIONS
HIGH FIBER DIET PATIENT INSTRUCTIONS    For a high-fiber diet, here are some instructions and tips to help you incorporate more fiber into your meals effectively:  What is a High-Fiber Diet?    A high-fiber diet typically includes 25 to 30 grams of fiber per day. Fiber is essential for digestive health and can help regulate blood sugar levels, lower cholesterol, and promote a feeling of fullness.    Dietary Recommendations:    Choose Whole Grains:  Opt for whole grain bread, brown rice, quinoa, oats, and whole grain pasta instead of refined grains.  Increase Fruits and Vegetables:  Aim for at least 5 servings of fruits and vegetables daily. Include a variety of colors and types for maximum nutrients.  Examples: Berries, apples, oranges, broccoli, carrots, leafy greens.  Incorporate Legumes:  Beans, lentils, and peas are excellent sources of fiber. Add them to soups, salads, and main dishes.  Aim for at least 1 cup of legumes per week.  Snack on Nuts and Seeds:  Almonds, antionette seeds, flaxseeds, and walnuts are high in fiber. Use them in yogurt, salads, or as snacks.  Add Fiber Gradually:  Increase your fiber intake slowly over several days to prevent digestive discomfort.  Stay Hydrated:  Drink plenty of water throughout the day, as fiber absorbs water and helps with digestion.       Fiber Supplement Recommendations:    Stay Hydrated: Increase fluid intake when using these products, as they can absorb water and help facilitate bowel movements.    -Miralax (Polyethylene Glycol 3350)    Typical Adult Dose:  17 grams (1 capful or 1 scoop) dissolved in 8 ounces of water, juice, or another liquid once daily.  Use: Generally used for occasional constipation.  Duration: Can be used for up to 7 days for constipation relief unless directed otherwise by a healthcare provider.      -Metamucil (Psyllium Husk)    Typical Adult Dose:  For Fiber Supplementation:  1 tablespoon (about 12 grams) in 8 ounces of water, taken 1 to 3 times

## 2025-04-18 ENCOUNTER — TELEPHONE (OUTPATIENT)
Dept: INTERNAL MEDICINE CLINIC | Facility: CLINIC | Age: 75
End: 2025-04-18

## 2025-04-18 NOTE — TELEPHONE ENCOUNTER
----- Message from Dr. Ric Salinas MD sent at 4/14/2025  6:41 PM EDT -----  I ordered some lab work for the patient.  See orders.  Please have him complete.      RIC SALINAS MD

## 2025-04-18 NOTE — TELEPHONE ENCOUNTER
Please contact pt to schedule labs ordered by Dr. Salinas. Also please see if he still needs the appt with Dr. Salinas for the 23rd since he was just seen on the 14th. Thank you.

## 2025-04-23 ENCOUNTER — OFFICE VISIT (OUTPATIENT)
Dept: INTERNAL MEDICINE CLINIC | Facility: CLINIC | Age: 75
End: 2025-04-23
Payer: MEDICARE

## 2025-04-23 VITALS
BODY MASS INDEX: 26.6 KG/M2 | HEIGHT: 71 IN | SYSTOLIC BLOOD PRESSURE: 118 MMHG | OXYGEN SATURATION: 97 % | DIASTOLIC BLOOD PRESSURE: 78 MMHG | WEIGHT: 190 LBS | TEMPERATURE: 98.4 F | HEART RATE: 65 BPM

## 2025-04-23 DIAGNOSIS — B08.1 MOLLUSCUM CONTAGIOSUM: Primary | ICD-10-CM

## 2025-04-23 DIAGNOSIS — K42.9 UMBILICAL HERNIA WITHOUT OBSTRUCTION AND WITHOUT GANGRENE: ICD-10-CM

## 2025-04-23 DIAGNOSIS — G45.9 TIA (TRANSIENT ISCHEMIC ATTACK): ICD-10-CM

## 2025-04-23 PROCEDURE — 1160F RVW MEDS BY RX/DR IN RCRD: CPT | Performed by: INTERNAL MEDICINE

## 2025-04-23 PROCEDURE — G8417 CALC BMI ABV UP PARAM F/U: HCPCS | Performed by: INTERNAL MEDICINE

## 2025-04-23 PROCEDURE — G8427 DOCREV CUR MEDS BY ELIG CLIN: HCPCS | Performed by: INTERNAL MEDICINE

## 2025-04-23 PROCEDURE — 99213 OFFICE O/P EST LOW 20 MIN: CPT | Performed by: INTERNAL MEDICINE

## 2025-04-23 PROCEDURE — G2211 COMPLEX E/M VISIT ADD ON: HCPCS | Performed by: INTERNAL MEDICINE

## 2025-04-23 PROCEDURE — 1123F ACP DISCUSS/DSCN MKR DOCD: CPT | Performed by: INTERNAL MEDICINE

## 2025-04-23 PROCEDURE — 1036F TOBACCO NON-USER: CPT | Performed by: INTERNAL MEDICINE

## 2025-04-23 PROCEDURE — 1126F AMNT PAIN NOTED NONE PRSNT: CPT | Performed by: INTERNAL MEDICINE

## 2025-04-23 PROCEDURE — 1159F MED LIST DOCD IN RCRD: CPT | Performed by: INTERNAL MEDICINE

## 2025-04-23 RX ORDER — NICOTINE 14MG/24HR
PATCH, TRANSDERMAL 24 HOURS TRANSDERMAL
COMMUNITY

## 2025-04-23 SDOH — ECONOMIC STABILITY: FOOD INSECURITY: WITHIN THE PAST 12 MONTHS, YOU WORRIED THAT YOUR FOOD WOULD RUN OUT BEFORE YOU GOT MONEY TO BUY MORE.: NEVER TRUE

## 2025-04-23 SDOH — ECONOMIC STABILITY: FOOD INSECURITY: WITHIN THE PAST 12 MONTHS, THE FOOD YOU BOUGHT JUST DIDN'T LAST AND YOU DIDN'T HAVE MONEY TO GET MORE.: NEVER TRUE

## 2025-04-23 ASSESSMENT — PATIENT HEALTH QUESTIONNAIRE - PHQ9
SUM OF ALL RESPONSES TO PHQ QUESTIONS 1-9: 0
SUM OF ALL RESPONSES TO PHQ QUESTIONS 1-9: 0
1. LITTLE INTEREST OR PLEASURE IN DOING THINGS: NOT AT ALL
2. FEELING DOWN, DEPRESSED OR HOPELESS: NOT AT ALL
SUM OF ALL RESPONSES TO PHQ QUESTIONS 1-9: 0
SUM OF ALL RESPONSES TO PHQ QUESTIONS 1-9: 0

## 2025-04-23 NOTE — PATIENT INSTRUCTIONS
Molluscum Contagiosum: What You Need to Know  What is it?  Molluscum contagiosum is a mild skin infection caused by a virus. It causes small, round, painless bumps on the skin that may have a dimple in the center.  It’s not dangerous and often goes away on its own, but it can spread from person to person or to other parts of your own body.    ?? What does it look like?  Small, flesh-colored or white bumps  Smooth and firm  Often have a tiny dip in the center  Can appear anywhere on the skin, but common areas include:  Trunk, arms, and legs  Face or neck  Groin or genital area in adults

## 2025-04-23 NOTE — PROGRESS NOTES
Chief Complaint   Patient presents with    Lesion(s)     Right lower abdomen,left back upper leg and left forearm, no pain ,minimal itching and some tenderness to the touch,noticed about 9 days ago       Triston Thompson 74 y.o. male   History of Present Illness  The patient presents for evaluation of a rash     The chief complaint is the development of several small, itchy bumps approximately one week ago. Initially, itching was experienced in one or two areas, which subsequently evolved into two or three additional pimple-like lesions. The itching persisted for a day or two before the appearance of the rash. The rash first appeared on 04/18/2025 and is located on the right lower abdomen, upper groin area, buttocks, scalp, and left forearm. Pain is noted upon pressure application or bending over. Slight dizziness is reported, and caution has been exercised regarding sudden head movements. Several minor episodes of vertigo have occurred in the past decade, and this may be related.    A similar incident is recalled from ages 10 to 12, involving two uncomfortable boils that oozed and caused discomfort for about a month.     No tick or bug bites are reported.                No results found for this or any previous visit (from the past 2160 hours).      /78 (BP Site: Left Upper Arm, Patient Position: Sitting, BP Cuff Size: Small Adult)   Pulse 65   Temp 98.4 °F (36.9 °C) (Temporal)   Ht 1.803 m (5' 11\")   Wt 86.2 kg (190 lb)   SpO2 97%   BMI 26.50 kg/m²   BP Readings from Last 3 Encounters:   04/23/25 118/78   04/14/25 126/80   11/19/24 128/62     Wt Readings from Last 3 Encounters:   04/23/25 86.2 kg (190 lb)   04/14/25 86.6 kg (191 lb)   11/19/24 85.7 kg (189 lb)       Physical Exam  Vitals and nursing note reviewed. Exam conducted with a chaperone present.   Constitutional:       General: He is not in acute distress.     Appearance: Normal appearance.   HENT:      Head: Normocephalic and atraumatic.

## 2025-05-01 ENCOUNTER — LAB (OUTPATIENT)
Dept: INTERNAL MEDICINE CLINIC | Facility: CLINIC | Age: 75
End: 2025-05-01

## 2025-05-01 DIAGNOSIS — R29.898 TRANSIENT WEAKNESS OF LEFT LEG: ICD-10-CM

## 2025-05-01 DIAGNOSIS — E78.2 MODERATE MIXED HYPERLIPIDEMIA NOT REQUIRING STATIN THERAPY: ICD-10-CM

## 2025-05-01 DIAGNOSIS — R29.898 LEFT ARM WEAKNESS: ICD-10-CM

## 2025-05-01 DIAGNOSIS — K57.90 DIVERTICULOSIS: ICD-10-CM

## 2025-05-01 DIAGNOSIS — G45.9 TIA (TRANSIENT ISCHEMIC ATTACK): ICD-10-CM

## 2025-05-01 DIAGNOSIS — R20.0 LEFT ARM NUMBNESS: ICD-10-CM

## 2025-05-01 LAB
ALBUMIN SERPL-MCNC: 3.7 G/DL (ref 3.2–4.6)
ALBUMIN/GLOB SERPL: 1.2 (ref 1–1.9)
ALP SERPL-CCNC: 57 U/L (ref 40–129)
ALT SERPL-CCNC: 29 U/L (ref 8–55)
ANION GAP SERPL CALC-SCNC: 11 MMOL/L (ref 7–16)
AST SERPL-CCNC: 29 U/L (ref 15–37)
BASOPHILS # BLD: 0.1 K/UL (ref 0–0.2)
BASOPHILS NFR BLD: 1 % (ref 0–2)
BILIRUB SERPL-MCNC: 0.7 MG/DL (ref 0–1.2)
BUN SERPL-MCNC: 12 MG/DL (ref 8–23)
CALCIUM SERPL-MCNC: 9.5 MG/DL (ref 8.8–10.2)
CHLORIDE SERPL-SCNC: 102 MMOL/L (ref 98–107)
CHOLEST SERPL-MCNC: 186 MG/DL (ref 0–200)
CO2 SERPL-SCNC: 25 MMOL/L (ref 20–29)
CREAT SERPL-MCNC: 1.18 MG/DL (ref 0.8–1.3)
CRP SERPL-MCNC: <0.3 MG/DL (ref 0–0.4)
DIFFERENTIAL METHOD BLD: ABNORMAL
EOSINOPHIL # BLD: 0.52 K/UL (ref 0–0.8)
EOSINOPHIL NFR BLD: 5.2 % (ref 0.5–7.8)
ERYTHROCYTE [DISTWIDTH] IN BLOOD BY AUTOMATED COUNT: 14.6 % (ref 11.9–14.6)
ERYTHROCYTE [SEDIMENTATION RATE] IN BLOOD: 3 MM/HR
GLOBULIN SER CALC-MCNC: 3.2 G/DL (ref 2.3–3.5)
GLUCOSE SERPL-MCNC: 99 MG/DL (ref 70–99)
HCT VFR BLD AUTO: 50.4 % (ref 41.1–50.3)
HDLC SERPL-MCNC: 51 MG/DL (ref 40–60)
HDLC SERPL: 3.6 (ref 0–5)
HGB BLD-MCNC: 16.4 G/DL (ref 13.6–17.2)
IMM GRANULOCYTES # BLD AUTO: 0.04 K/UL (ref 0–0.5)
IMM GRANULOCYTES NFR BLD AUTO: 0.4 % (ref 0–5)
LDLC SERPL CALC-MCNC: 115 MG/DL (ref 0–100)
LYMPHOCYTES # BLD: 2.89 K/UL (ref 0.5–4.6)
LYMPHOCYTES NFR BLD: 29.1 % (ref 13–44)
MCH RBC QN AUTO: 31.1 PG (ref 26.1–32.9)
MCHC RBC AUTO-ENTMCNC: 32.5 G/DL (ref 31.4–35)
MCV RBC AUTO: 95.5 FL (ref 82–102)
MONOCYTES # BLD: 1.12 K/UL (ref 0.1–1.3)
MONOCYTES NFR BLD: 11.3 % (ref 4–12)
NEUTS SEG # BLD: 5.26 K/UL (ref 1.7–8.2)
NEUTS SEG NFR BLD: 53 % (ref 43–78)
NRBC # BLD: 0 K/UL (ref 0–0.2)
PLATELET # BLD AUTO: 259 K/UL (ref 150–450)
PMV BLD AUTO: 11.1 FL (ref 9.4–12.3)
POTASSIUM SERPL-SCNC: 4.8 MMOL/L (ref 3.5–5.1)
PROT SERPL-MCNC: 6.9 G/DL (ref 6.3–8.2)
RBC # BLD AUTO: 5.28 M/UL (ref 4.23–5.6)
SODIUM SERPL-SCNC: 138 MMOL/L (ref 136–145)
TRIGL SERPL-MCNC: 101 MG/DL (ref 0–150)
VLDLC SERPL CALC-MCNC: 20 MG/DL (ref 6–23)
WBC # BLD AUTO: 9.9 K/UL (ref 4.3–11.1)

## 2025-05-06 ENCOUNTER — RESULTS FOLLOW-UP (OUTPATIENT)
Dept: INTERNAL MEDICINE CLINIC | Facility: CLINIC | Age: 75
End: 2025-05-06

## (undated) DEVICE — REM POLYHESIVE ADULT PATIENT RETURN ELECTRODE: Brand: VALLEYLAB

## (undated) DEVICE — SURGICAL PROCEDURE PACK BASIC ST FRANCIS

## (undated) DEVICE — SUTURE ETHLN SZ 2-0 L18IN NONABSORBABLE BLK L26MM PS 3/8 585H

## (undated) DEVICE — SOLUTION IV 1000ML 0.9% SOD CHL

## (undated) DEVICE — AMD ANTIMICROBIAL GAUZE SPONGES,12 PLY USP TYPE VII, 0.2% POLYHEXAMETHYLENE BIGUANIDE HCI (PHMB): Brand: CURITY

## (undated) DEVICE — SKIN STAPLER: Brand: SIGNET

## (undated) DEVICE — Z CONVERTED USE 2421973 CONTAINER SPEC 60/30ML 10% FRMLN POLYPR PREFIL

## (undated) DEVICE — 2000CC GUARDIAN II: Brand: GUARDIAN

## (undated) DEVICE — TRAY PREP DRY W/ PREM GLV 2 APPL 6 SPNG 2 UNDPD 1 OVERWRAP

## (undated) DEVICE — SUTURE VCRL SZ 2-0 L27IN ABSRB UD L26MM CT-2 1/2 CIR J269H